# Patient Record
Sex: MALE | Race: BLACK OR AFRICAN AMERICAN | NOT HISPANIC OR LATINO | URBAN - METROPOLITAN AREA
[De-identification: names, ages, dates, MRNs, and addresses within clinical notes are randomized per-mention and may not be internally consistent; named-entity substitution may affect disease eponyms.]

---

## 2017-08-12 ENCOUNTER — HOSPITAL ENCOUNTER (EMERGENCY)
Facility: HOSPITAL | Age: 32
Discharge: HOME | End: 2017-08-12
Attending: EMERGENCY MEDICINE | Admitting: EMERGENCY MEDICINE

## 2017-08-12 VITALS
HEART RATE: 91 BPM | BODY MASS INDEX: 34.07 KG/M2 | SYSTOLIC BLOOD PRESSURE: 136 MMHG | OXYGEN SATURATION: 99 % | TEMPERATURE: 98.4 F | WEIGHT: 230 LBS | DIASTOLIC BLOOD PRESSURE: 87 MMHG | HEIGHT: 69 IN | RESPIRATION RATE: 18 BRPM

## 2017-08-12 DIAGNOSIS — R73.9 HYPERGLYCEMIA: ICD-10-CM

## 2017-08-12 DIAGNOSIS — E11.9 TYPE 2 DIABETES MELLITUS WITHOUT COMPLICATION, WITHOUT LONG-TERM CURRENT USE OF INSULIN (HCC): Primary | ICD-10-CM

## 2017-08-12 LAB
ALBUMIN SERPL-MCNC: 4.8 G/DL (ref 3.2–4.8)
ALBUMIN/GLOB SERPL: 1.6 G/DL (ref 1.5–2.5)
ALP SERPL-CCNC: 137 U/L (ref 25–100)
ALT SERPL W P-5'-P-CCNC: 24 U/L (ref 7–40)
ANION GAP SERPL CALCULATED.3IONS-SCNC: 11 MMOL/L (ref 3–11)
AST SERPL-CCNC: 14 U/L (ref 0–33)
B-OH-BUTYR SERPL-SCNC: 1.09 MMOL/L
BACTERIA UR QL AUTO: ABNORMAL /HPF
BASOPHILS # BLD AUTO: 0.03 10*3/MM3 (ref 0–0.2)
BASOPHILS NFR BLD AUTO: 0.4 % (ref 0–1)
BILIRUB SERPL-MCNC: 0.4 MG/DL (ref 0.3–1.2)
BILIRUB UR QL STRIP: NEGATIVE
BUN BLD-MCNC: 12 MG/DL (ref 9–23)
BUN/CREAT SERPL: 10 (ref 7–25)
CALCIUM SPEC-SCNC: 10.1 MG/DL (ref 8.7–10.4)
CHLORIDE SERPL-SCNC: 91 MMOL/L (ref 99–109)
CLARITY UR: ABNORMAL
CO2 SERPL-SCNC: 24 MMOL/L (ref 20–31)
COLOR UR: YELLOW
CREAT BLD-MCNC: 1.2 MG/DL (ref 0.6–1.3)
DEPRECATED RDW RBC AUTO: 37.7 FL (ref 37–54)
EOSINOPHIL # BLD AUTO: 0.12 10*3/MM3 (ref 0–0.3)
EOSINOPHIL NFR BLD AUTO: 1.5 % (ref 0–3)
ERYTHROCYTE [DISTWIDTH] IN BLOOD BY AUTOMATED COUNT: 12.7 % (ref 11.3–14.5)
GFR SERPL CREATININE-BSD FRML MDRD: 85 ML/MIN/1.73
GLOBULIN UR ELPH-MCNC: 3 GM/DL
GLUCOSE BLD-MCNC: 580 MG/DL (ref 70–100)
GLUCOSE BLDC GLUCOMTR-MCNC: 421 MG/DL (ref 70–130)
GLUCOSE BLDC GLUCOMTR-MCNC: 451 MG/DL (ref 70–130)
GLUCOSE BLDC GLUCOMTR-MCNC: 489 MG/DL (ref 70–130)
GLUCOSE BLDC GLUCOMTR-MCNC: 563 MG/DL (ref 70–130)
GLUCOSE UR STRIP-MCNC: ABNORMAL MG/DL
HBA1C MFR BLD: 9.4 % (ref 4.8–5.6)
HCT VFR BLD AUTO: 44 % (ref 38.9–50.9)
HGB BLD-MCNC: 15.7 G/DL (ref 13.1–17.5)
HGB UR QL STRIP.AUTO: NEGATIVE
HOLD SPECIMEN: NORMAL
HOLD SPECIMEN: NORMAL
HYALINE CASTS UR QL AUTO: ABNORMAL /LPF
IMM GRANULOCYTES # BLD: 0.01 10*3/MM3 (ref 0–0.03)
IMM GRANULOCYTES NFR BLD: 0.1 % (ref 0–0.6)
KETONES UR QL STRIP: ABNORMAL
LEUKOCYTE ESTERASE UR QL STRIP.AUTO: NEGATIVE
LYMPHOCYTES # BLD AUTO: 3.75 10*3/MM3 (ref 0.6–4.8)
LYMPHOCYTES NFR BLD AUTO: 47.2 % (ref 24–44)
MCH RBC QN AUTO: 28.6 PG (ref 27–31)
MCHC RBC AUTO-ENTMCNC: 35.7 G/DL (ref 32–36)
MCV RBC AUTO: 80.3 FL (ref 80–99)
MONOCYTES # BLD AUTO: 0.52 10*3/MM3 (ref 0–1)
MONOCYTES NFR BLD AUTO: 6.5 % (ref 0–12)
NEUTROPHILS # BLD AUTO: 3.51 10*3/MM3 (ref 1.5–8.3)
NEUTROPHILS NFR BLD AUTO: 44.3 % (ref 41–71)
NITRITE UR QL STRIP: NEGATIVE
PH UR STRIP.AUTO: 5.5 [PH] (ref 5–8)
PLATELET # BLD AUTO: 244 10*3/MM3 (ref 150–450)
PMV BLD AUTO: 10.2 FL (ref 6–12)
POTASSIUM BLD-SCNC: 4.3 MMOL/L (ref 3.5–5.5)
PROT SERPL-MCNC: 7.8 G/DL (ref 5.7–8.2)
PROT UR QL STRIP: NEGATIVE
RBC # BLD AUTO: 5.48 10*6/MM3 (ref 4.2–5.76)
RBC # UR: ABNORMAL /HPF
REF LAB TEST METHOD: ABNORMAL
SODIUM BLD-SCNC: 126 MMOL/L (ref 132–146)
SP GR UR STRIP: 1.04 (ref 1–1.03)
SQUAMOUS #/AREA URNS HPF: ABNORMAL /HPF
UROBILINOGEN UR QL STRIP: ABNORMAL
WBC NRBC COR # BLD: 7.94 10*3/MM3 (ref 3.5–10.8)
WBC UR QL AUTO: ABNORMAL /HPF
WHOLE BLOOD HOLD SPECIMEN: NORMAL
WHOLE BLOOD HOLD SPECIMEN: NORMAL

## 2017-08-12 PROCEDURE — 81001 URINALYSIS AUTO W/SCOPE: CPT

## 2017-08-12 PROCEDURE — 85025 COMPLETE CBC W/AUTO DIFF WBC: CPT

## 2017-08-12 PROCEDURE — 99284 EMERGENCY DEPT VISIT MOD MDM: CPT

## 2017-08-12 PROCEDURE — 80053 COMPREHEN METABOLIC PANEL: CPT

## 2017-08-12 PROCEDURE — 83036 HEMOGLOBIN GLYCOSYLATED A1C: CPT | Performed by: PHYSICIAN ASSISTANT

## 2017-08-12 PROCEDURE — 63710000001 INSULIN REGULAR HUMAN PER 5 UNITS: Performed by: PHYSICIAN ASSISTANT

## 2017-08-12 PROCEDURE — 82962 GLUCOSE BLOOD TEST: CPT

## 2017-08-12 PROCEDURE — 96361 HYDRATE IV INFUSION ADD-ON: CPT

## 2017-08-12 PROCEDURE — 86341 ISLET CELL ANTIBODY: CPT | Performed by: PHYSICIAN ASSISTANT

## 2017-08-12 PROCEDURE — 96360 HYDRATION IV INFUSION INIT: CPT

## 2017-08-12 PROCEDURE — 82010 KETONE BODYS QUAN: CPT

## 2017-08-12 RX ORDER — SODIUM CHLORIDE 0.9 % (FLUSH) 0.9 %
10 SYRINGE (ML) INJECTION AS NEEDED
Status: DISCONTINUED | OUTPATIENT
Start: 2017-08-12 | End: 2017-08-12 | Stop reason: HOSPADM

## 2017-08-12 RX ADMIN — SODIUM CHLORIDE 2000 ML: 9 INJECTION, SOLUTION INTRAVENOUS at 14:17

## 2017-08-12 RX ADMIN — INSULIN HUMAN 10 UNITS: 100 INJECTION, SOLUTION PARENTERAL at 15:31

## 2017-08-12 NOTE — ED PROVIDER NOTES
Subjective   Patient is a 31 y.o. male presenting with hyperglycemia.   History provided by:  Patient   used: No    Hyperglycemia   Blood sugar level PTA:  Too high to read  Severity:  Severe  Onset quality:  Gradual  Duration:  2 weeks  Timing:  Constant  Progression:  Worsening  Chronicity:  New  Diabetes status:  Non-diabetic  Relieved by:  Nothing  Ineffective treatments:  None tried  Associated symptoms: blurred vision and polyuria    Associated symptoms: no abdominal pain, no dysuria, no fever, no increased appetite, no nausea, no shortness of breath, no vomiting and no weakness    Risk factors: no obesity and no recent steroid use        Review of Systems   Constitutional: Negative for chills and fever.   HENT: Negative for ear pain, rhinorrhea and sore throat.    Eyes: Positive for blurred vision.   Respiratory: Negative for shortness of breath.    Gastrointestinal: Negative for abdominal pain, nausea and vomiting.   Endocrine: Positive for polyuria.   Genitourinary: Negative for dysuria, frequency and testicular pain.   Musculoskeletal: Negative for back pain and neck pain.   Neurological: Negative for seizures, weakness and headaches.   Psychiatric/Behavioral: Negative.    All other systems reviewed and are negative.      History reviewed. No pertinent past medical history.    No Known Allergies    History reviewed. No pertinent surgical history.    History reviewed. No pertinent family history.    Social History     Social History   • Marital status: Single     Spouse name: N/A   • Number of children: N/A   • Years of education: N/A     Social History Main Topics   • Smoking status: Current Every Day Smoker     Packs/day: 0.50   • Smokeless tobacco: None   • Alcohol use No   • Drug use: No   • Sexual activity: Defer     Other Topics Concern   • None     Social History Narrative   • None           Objective   Physical Exam   Constitutional: He is oriented to person, place, and time. He  appears well-developed and well-nourished.   HENT:   Head: Normocephalic and atraumatic.   Right Ear: External ear normal.   Left Ear: External ear normal.   Nose: Nose normal.   Mouth/Throat: Oropharynx is clear and moist.   Eyes: Conjunctivae and EOM are normal. Pupils are equal, round, and reactive to light. No scleral icterus.   Neck: Normal range of motion. No thyromegaly present.   Cardiovascular: Normal rate, regular rhythm and normal heart sounds.    Pulmonary/Chest: Effort normal and breath sounds normal. No respiratory distress. He has no wheezes. He has no rales. He exhibits no tenderness.   Abdominal: Soft. Bowel sounds are normal. He exhibits no distension. There is no tenderness.   Musculoskeletal: Normal range of motion.   Lymphadenopathy:     He has no cervical adenopathy.   Neurological: He is alert and oriented to person, place, and time. He has normal reflexes. He displays normal reflexes. No cranial nerve deficit. Coordination normal.   Skin: Skin is warm and dry.   Psychiatric: He has a normal mood and affect. His behavior is normal. Judgment and thought content normal.   Nursing note and vitals reviewed.      Procedures         ED Course  ED Course   Comment By Time   Discussed with Dr. Membreno.  Suggested starting on metformin 500 mg twice a day.  That she's happy to follow the patient up with their about 6 weeks out probably needs a PMD to follow-up with. ABDULAZIZ Landrum 08/12 1753        No results found for this or any previous visit (from the past 24 hour(s)).  Note: In addition to lab results from this visit, the labs listed above may include labs taken at another facility or during a different encounter within the last 24 hours. Please correlate lab times with ED admission and discharge times for further clarification of the services performed during this visit.    No orders to display     Vitals:    08/12/17 1649 08/12/17 1745 08/12/17 1746 08/12/17 1808   BP: 135/79 138/89 138/89  136/87   BP Location: Left arm  Left arm Right arm   Patient Position: Lying  Lying Sitting   Pulse: 98 97 95 91   Resp:    18   Temp:    98.4 °F (36.9 °C)   TempSrc:    Oral   SpO2: 97% 99% 99% 99%   Weight:       Height:         Medications   sodium chloride 0.9 % bolus 2,000 mL (0 mL Intravenous Stopped 8/12/17 1809)   insulin regular (humuLIN R,novoLIN R) injection 10 Units (10 Units Subcutaneous Given 8/12/17 1531)     ECG/EMG Results (last 24 hours)     ** No results found for the last 24 hours. **                  MDM  Number of Diagnoses or Management Options  new and requires workup  new and requires workup     Amount and/or Complexity of Data Reviewed  Clinical lab tests: reviewed and ordered  Tests in the radiology section of CPT®: reviewed and ordered  Tests in the medicine section of CPT®: ordered and reviewed  Discuss the patient with other providers: yes        Final diagnoses:   Type 2 diabetes mellitus without complication, without long-term current use of insulin   Hyperglycemia            ABDULAZIZ Landrum  08/16/17 0165

## 2017-08-12 NOTE — DISCHARGE INSTRUCTIONS
Follow up with one of the physician centers below to setup primary care.    [facility], [facility], [phone], [address], [address], [place], [address]    [facility], [phone], [address], [address]    [facility], [phone], [address] [place], [address];     [facility], [phone], [address], [address]      Follow up with one of the [facility] physician groups below to setup primary care. If you have trouble following up, please call Chiquis Lynch, our transitional care nurse, at [phone].    (Dr. Allen, Dr. Louis, Dr. Dickson, and Dr. Kunz.)  [facility], Primary Care, [phone], [address], [place] [address]    [facility], Primary Care, [phone], [address], [address] [place], [address] [facility], Primary Care, [phone], [address], [address] [place], [address] [facility] Medical [facility], Primary Care, [phone], [address], [address] [place], [address]     [place] [facility], Primary Care, [phone], [address], [address] [place], [address]    [place] [facility], Primary Care, [phone], [address], [place]    [place], [address] [facility], Primary Care, [phone], [address], [address] [place], [address] [facility], Primary Care, [phone], [address] [place], [address] [facility]) [facility] Medical [facility], Primary Care, [phone], [address], [address] [place], [address] [facility]  Medical [facility], Primary Care, [phone], [address], [address] [place], [id number] [place] [address]     ([facility]) [facility], Primary Care, [phone], [address], [place], [address] [place] [address], Primary Care, [phone], [address] [place], [place]    Keep a log of your blood sugars to take to your PMD when you see them.

## 2017-08-15 LAB — GAD65 AB SER-ACNC: <5 U/ML (ref 0–5)

## 2017-10-12 ENCOUNTER — OFFICE VISIT (OUTPATIENT)
Dept: FAMILY MEDICINE CLINIC | Facility: CLINIC | Age: 32
End: 2017-10-12

## 2017-10-12 ENCOUNTER — HOSPITAL ENCOUNTER (INPATIENT)
Facility: HOSPITAL | Age: 32
LOS: 1 days | Discharge: HOME | End: 2017-10-13
Attending: EMERGENCY MEDICINE | Admitting: INTERNAL MEDICINE

## 2017-10-12 VITALS
WEIGHT: 160 LBS | OXYGEN SATURATION: 98 % | HEART RATE: 98 BPM | RESPIRATION RATE: 18 BRPM | HEIGHT: 69 IN | BODY MASS INDEX: 23.7 KG/M2 | TEMPERATURE: 98.3 F

## 2017-10-12 DIAGNOSIS — IMO0002 UNCONTROLLED TYPE 2 DIABETES MELLITUS WITH COMPLICATION, WITHOUT LONG-TERM CURRENT USE OF INSULIN: Primary | ICD-10-CM

## 2017-10-12 DIAGNOSIS — N28.9 ACUTE RENAL INSUFFICIENCY: ICD-10-CM

## 2017-10-12 DIAGNOSIS — E86.0 DEHYDRATION: ICD-10-CM

## 2017-10-12 DIAGNOSIS — I49.9 IRREGULAR HEART RHYTHM: ICD-10-CM

## 2017-10-12 DIAGNOSIS — E08.10 DIABETIC KETOACIDOSIS WITHOUT COMA ASSOCIATED WITH DIABETES MELLITUS DUE TO UNDERLYING CONDITION (HCC): Primary | ICD-10-CM

## 2017-10-12 DIAGNOSIS — E11.65 TYPE 2 DIABETES MELLITUS WITH HYPERGLYCEMIA, WITHOUT LONG-TERM CURRENT USE OF INSULIN (HCC): ICD-10-CM

## 2017-10-12 PROBLEM — R63.4 UNINTENTIONAL WEIGHT LOSS: Status: ACTIVE | Noted: 2017-10-12

## 2017-10-12 PROBLEM — E11.10 DKA (DIABETIC KETOACIDOSES): Status: ACTIVE | Noted: 2017-10-12

## 2017-10-12 PROBLEM — E87.29 HIGH ANION GAP METABOLIC ACIDOSIS: Status: ACTIVE | Noted: 2017-10-12

## 2017-10-12 PROBLEM — N17.9 AKI (ACUTE KIDNEY INJURY): Status: ACTIVE | Noted: 2017-10-12

## 2017-10-12 PROBLEM — Z91.199 NONCOMPLIANCE: Status: ACTIVE | Noted: 2017-10-12

## 2017-10-12 PROBLEM — Z72.0 TOBACCO ABUSE: Status: ACTIVE | Noted: 2017-10-12

## 2017-10-12 LAB
ALBUMIN SERPL-MCNC: 4.7 G/DL (ref 3.2–4.8)
ALBUMIN SERPL-MCNC: 4.9 G/DL (ref 3.2–4.8)
ALBUMIN/GLOB SERPL: 1.5 G/DL (ref 1.5–2.5)
ALBUMIN/GLOB SERPL: 1.7 G/DL (ref 1.5–2.5)
ALP SERPL-CCNC: 101 U/L (ref 25–100)
ALP SERPL-CCNC: 104 U/L (ref 25–100)
ALT SERPL W P-5'-P-CCNC: 14 U/L (ref 7–40)
ALT SERPL W P-5'-P-CCNC: 14 U/L (ref 7–40)
ANION GAP SERPL CALCULATED.3IONS-SCNC: 15 MMOL/L (ref 3–11)
ANION GAP SERPL CALCULATED.3IONS-SCNC: 17 MMOL/L (ref 3–11)
ANION GAP SERPL CALCULATED.3IONS-SCNC: 9 MMOL/L (ref 3–11)
ARTERIAL PATENCY WRIST A: ABNORMAL
AST SERPL-CCNC: 11 U/L (ref 0–33)
AST SERPL-CCNC: 12 U/L (ref 0–33)
ATMOSPHERIC PRESS: ABNORMAL MMHG
B-OH-BUTYR SERPL-SCNC: 4.32 MMOL/L
BASE EXCESS BLDA CALC-SCNC: -10.2 MMOL/L (ref 0–2)
BASOPHILS # BLD AUTO: 0.03 10*3/MM3 (ref 0–0.2)
BASOPHILS NFR BLD AUTO: 0.5 % (ref 0–1)
BDY SITE: ABNORMAL
BILIRUB SERPL-MCNC: 0.4 MG/DL (ref 0.3–1.2)
BILIRUB SERPL-MCNC: 0.5 MG/DL (ref 0.3–1.2)
BILIRUB UR QL STRIP: NEGATIVE
BUN BLD-MCNC: 10 MG/DL (ref 9–23)
BUN BLD-MCNC: 12 MG/DL (ref 9–23)
BUN BLD-MCNC: 14 MG/DL (ref 9–23)
BUN/CREAT SERPL: 10 (ref 7–25)
BUN/CREAT SERPL: 10.8 (ref 7–25)
BUN/CREAT SERPL: 8 (ref 7–25)
CALCIUM SPEC-SCNC: 10.3 MG/DL (ref 8.7–10.4)
CALCIUM SPEC-SCNC: 9.1 MG/DL (ref 8.7–10.4)
CALCIUM SPEC-SCNC: 9.8 MG/DL (ref 8.7–10.4)
CHLORIDE SERPL-SCNC: 105 MMOL/L (ref 99–109)
CHLORIDE SERPL-SCNC: 89 MMOL/L (ref 99–109)
CHLORIDE SERPL-SCNC: 97 MMOL/L (ref 99–109)
CLARITY UR: CLEAR
CO2 BLDA-SCNC: 15.6 MMOL/L (ref 22–33)
CO2 SERPL-SCNC: 20 MMOL/L (ref 20–31)
COHGB MFR BLD: 2 % (ref 0–2)
COLOR UR: YELLOW
CREAT BLD-MCNC: 1 MG/DL (ref 0.6–1.3)
CREAT BLD-MCNC: 1.3 MG/DL (ref 0.6–1.3)
CREAT BLD-MCNC: 1.5 MG/DL (ref 0.6–1.3)
DEPRECATED RDW RBC AUTO: 43 FL (ref 37–54)
EOSINOPHIL # BLD AUTO: 0.06 10*3/MM3 (ref 0–0.3)
EOSINOPHIL NFR BLD AUTO: 1.1 % (ref 0–3)
ERYTHROCYTE [DISTWIDTH] IN BLOOD BY AUTOMATED COUNT: 14 % (ref 11.3–14.5)
GFR SERPL CREATININE-BSD FRML MDRD: 104 ML/MIN/1.73
GFR SERPL CREATININE-BSD FRML MDRD: 65 ML/MIN/1.73
GFR SERPL CREATININE-BSD FRML MDRD: 77 ML/MIN/1.73
GLOBULIN UR ELPH-MCNC: 2.9 GM/DL
GLOBULIN UR ELPH-MCNC: 3.2 GM/DL
GLUCOSE BLD-MCNC: 363 MG/DL (ref 70–100)
GLUCOSE BLD-MCNC: 375 MG/DL (ref 70–100)
GLUCOSE BLD-MCNC: 766 MG/DL (ref 70–100)
GLUCOSE BLDC GLUCOMTR-MCNC: 264 MG/DL (ref 70–130)
GLUCOSE BLDC GLUCOMTR-MCNC: 277 MG/DL (ref 70–130)
GLUCOSE BLDC GLUCOMTR-MCNC: 369 MG/DL (ref 70–130)
GLUCOSE BLDC GLUCOMTR-MCNC: 384 MG/DL (ref 70–130)
GLUCOSE BLDC GLUCOMTR-MCNC: 392 MG/DL (ref 70–130)
GLUCOSE UR STRIP-MCNC: ABNORMAL MG/DL
HBA1C MFR BLD: NORMAL %
HCO3 BLDA-SCNC: 14.7 MMOL/L (ref 20–26)
HCT VFR BLD AUTO: 46.7 % (ref 38.9–50.9)
HCT VFR BLD CALC: 43.4 %
HCT VFR BLDA CALC: 51.4 %
HGB BLD-MCNC: 15.7 G/DL (ref 13.1–17.5)
HGB BLDA-MCNC: 14.2 G/DL (ref 13.5–17.5)
HGB BLDA-MCNC: 16.6 G/DL
HGB UR QL STRIP.AUTO: NEGATIVE
HOLD SPECIMEN: NORMAL
HOLD SPECIMEN: NORMAL
HOROWITZ INDEX BLD+IHG-RTO: 21 %
IMM GRANULOCYTES # BLD: 0.02 10*3/MM3 (ref 0–0.03)
IMM GRANULOCYTES NFR BLD: 0.4 % (ref 0–0.6)
KETONES UR QL STRIP: ABNORMAL
LEUKOCYTE ESTERASE UR QL STRIP.AUTO: NEGATIVE
LYMPHOCYTES # BLD AUTO: 2.55 10*3/MM3 (ref 0.6–4.8)
LYMPHOCYTES NFR BLD AUTO: 46 % (ref 24–44)
MCH RBC QN AUTO: 28.1 PG (ref 27–31)
MCH, POC: 28.1
MCHC RBC AUTO-ENTMCNC: 33.6 G/DL (ref 32–36)
MCHC, POC: 32.3
MCV RBC AUTO: 83.7 FL (ref 80–99)
MCV, POC: 86.9
METHGB BLD QL: 1.2 % (ref 0–1.5)
MODALITY: ABNORMAL
MONOCYTES # BLD AUTO: 0.19 10*3/MM3 (ref 0–1)
MONOCYTES NFR BLD AUTO: 3.4 % (ref 0–12)
NEUTROPHILS # BLD AUTO: 2.69 10*3/MM3 (ref 1.5–8.3)
NEUTROPHILS NFR BLD AUTO: 48.6 % (ref 41–71)
NITRITE UR QL STRIP: NEGATIVE
OXYHGB MFR BLDV: 95.1 % (ref 94–99)
PCO2 BLDA: 29.4 MM HG (ref 35–48)
PH BLDA: 7.31 PH UNITS (ref 7.35–7.45)
PH UR STRIP.AUTO: <=5 [PH] (ref 5–8)
PLATELET # BLD AUTO: 194 10*3/MM3 (ref 150–450)
PLATELET # BLD: 203 10*3/MM3
PMV BLD AUTO: 11.8 FL (ref 6–12)
PMV BLD: 9 FL
PO2 BLDA: 106 MM HG (ref 83–108)
POTASSIUM BLD-SCNC: 4.3 MMOL/L (ref 3.5–5.5)
POTASSIUM BLD-SCNC: 4.5 MMOL/L (ref 3.5–5.5)
POTASSIUM BLD-SCNC: 5.4 MMOL/L (ref 3.5–5.5)
PROT SERPL-MCNC: 7.8 G/DL (ref 5.7–8.2)
PROT SERPL-MCNC: 7.9 G/DL (ref 5.7–8.2)
PROT UR QL STRIP: NEGATIVE
RBC # BLD AUTO: 5.58 10*6/MM3 (ref 4.2–5.76)
RBC, POC: 5.91
RDW, POC: 11.9
SODIUM BLD-SCNC: 124 MMOL/L (ref 132–146)
SODIUM BLD-SCNC: 134 MMOL/L (ref 132–146)
SODIUM BLD-SCNC: 134 MMOL/L (ref 132–146)
SP GR UR STRIP: 1.04 (ref 1–1.03)
UROBILINOGEN UR QL STRIP: ABNORMAL
WBC # BLD: 6.3 10*3/UL
WBC NRBC COR # BLD: 5.54 10*3/MM3 (ref 3.5–10.8)
WHOLE BLOOD HOLD SPECIMEN: NORMAL
WHOLE BLOOD HOLD SPECIMEN: NORMAL

## 2017-10-12 PROCEDURE — 82962 GLUCOSE BLOOD TEST: CPT

## 2017-10-12 PROCEDURE — 82962 GLUCOSE BLOOD TEST: CPT | Performed by: NURSE PRACTITIONER

## 2017-10-12 PROCEDURE — 82805 BLOOD GASES W/O2 SATURATION: CPT | Performed by: PHYSICIAN ASSISTANT

## 2017-10-12 PROCEDURE — 85025 COMPLETE CBC W/AUTO DIFF WBC: CPT | Performed by: EMERGENCY MEDICINE

## 2017-10-12 PROCEDURE — 93000 ELECTROCARDIOGRAM COMPLETE: CPT | Performed by: NURSE PRACTITIONER

## 2017-10-12 PROCEDURE — 36415 COLL VENOUS BLD VENIPUNCTURE: CPT

## 2017-10-12 PROCEDURE — 63710000001 INSULIN LISPRO (HUMAN) PER 5 UNITS: Performed by: NURSE PRACTITIONER

## 2017-10-12 PROCEDURE — 80053 COMPREHEN METABOLIC PANEL: CPT | Performed by: NURSE PRACTITIONER

## 2017-10-12 PROCEDURE — 99285 EMERGENCY DEPT VISIT HI MDM: CPT

## 2017-10-12 PROCEDURE — 82010 KETONE BODYS QUAN: CPT | Performed by: EMERGENCY MEDICINE

## 2017-10-12 PROCEDURE — 85025 COMPLETE CBC W/AUTO DIFF WBC: CPT | Performed by: NURSE PRACTITIONER

## 2017-10-12 PROCEDURE — 63710000001 INSULIN LISPRO (HUMAN) PER 5 UNITS: Performed by: PHYSICIAN ASSISTANT

## 2017-10-12 PROCEDURE — 99222 1ST HOSP IP/OBS MODERATE 55: CPT | Performed by: INTERNAL MEDICINE

## 2017-10-12 PROCEDURE — 84100 ASSAY OF PHOSPHORUS: CPT | Performed by: NURSE PRACTITIONER

## 2017-10-12 PROCEDURE — 63710000001 INSULIN DETEMIR PER 5 UNITS: Performed by: NURSE PRACTITIONER

## 2017-10-12 PROCEDURE — 83735 ASSAY OF MAGNESIUM: CPT | Performed by: NURSE PRACTITIONER

## 2017-10-12 PROCEDURE — 81003 URINALYSIS AUTO W/O SCOPE: CPT | Performed by: EMERGENCY MEDICINE

## 2017-10-12 PROCEDURE — 80048 BASIC METABOLIC PNL TOTAL CA: CPT | Performed by: NURSE PRACTITIONER

## 2017-10-12 PROCEDURE — 82330 ASSAY OF CALCIUM: CPT | Performed by: NURSE PRACTITIONER

## 2017-10-12 PROCEDURE — 99203 OFFICE O/P NEW LOW 30 MIN: CPT | Performed by: NURSE PRACTITIONER

## 2017-10-12 PROCEDURE — 80053 COMPREHEN METABOLIC PANEL: CPT | Performed by: EMERGENCY MEDICINE

## 2017-10-12 PROCEDURE — 36600 WITHDRAWAL OF ARTERIAL BLOOD: CPT | Performed by: PHYSICIAN ASSISTANT

## 2017-10-12 PROCEDURE — 83036 HEMOGLOBIN GLYCOSYLATED A1C: CPT | Performed by: NURSE PRACTITIONER

## 2017-10-12 RX ORDER — POTASSIUM CHLORIDE 1.5 G/1.77G
40 POWDER, FOR SOLUTION ORAL AS NEEDED
Status: DISCONTINUED | OUTPATIENT
Start: 2017-10-12 | End: 2017-10-13 | Stop reason: HOSPADM

## 2017-10-12 RX ORDER — POTASSIUM CHLORIDE 1.5 G/1.77G
20 POWDER, FOR SOLUTION ORAL AS NEEDED
Status: DISCONTINUED | OUTPATIENT
Start: 2017-10-12 | End: 2017-10-13 | Stop reason: HOSPADM

## 2017-10-12 RX ORDER — POTASSIUM CHLORIDE 750 MG/1
40 CAPSULE, EXTENDED RELEASE ORAL AS NEEDED
Status: DISCONTINUED | OUTPATIENT
Start: 2017-10-12 | End: 2017-10-13 | Stop reason: HOSPADM

## 2017-10-12 RX ORDER — NICOTINE 21 MG/24HR
1 PATCH, TRANSDERMAL 24 HOURS TRANSDERMAL
Status: DISCONTINUED | OUTPATIENT
Start: 2017-10-13 | End: 2017-10-13 | Stop reason: HOSPADM

## 2017-10-12 RX ORDER — HEPARIN SODIUM 5000 [USP'U]/ML
5000 INJECTION, SOLUTION INTRAVENOUS; SUBCUTANEOUS EVERY 8 HOURS SCHEDULED
Status: DISCONTINUED | OUTPATIENT
Start: 2017-10-13 | End: 2017-10-13 | Stop reason: HOSPADM

## 2017-10-12 RX ORDER — SODIUM CHLORIDE 0.9 % (FLUSH) 0.9 %
10 SYRINGE (ML) INJECTION AS NEEDED
Status: DISCONTINUED | OUTPATIENT
Start: 2017-10-12 | End: 2017-10-13 | Stop reason: HOSPADM

## 2017-10-12 RX ORDER — POTASSIUM CHLORIDE 750 MG/1
20 CAPSULE, EXTENDED RELEASE ORAL AS NEEDED
Status: DISCONTINUED | OUTPATIENT
Start: 2017-10-12 | End: 2017-10-13 | Stop reason: HOSPADM

## 2017-10-12 RX ORDER — ACETAMINOPHEN 325 MG/1
650 TABLET ORAL EVERY 4 HOURS PRN
Status: DISCONTINUED | OUTPATIENT
Start: 2017-10-12 | End: 2017-10-13 | Stop reason: HOSPADM

## 2017-10-12 RX ORDER — POTASSIUM CHLORIDE 1.5 G/1.77G
10 POWDER, FOR SOLUTION ORAL AS NEEDED
Status: DISCONTINUED | OUTPATIENT
Start: 2017-10-12 | End: 2017-10-13 | Stop reason: HOSPADM

## 2017-10-12 RX ORDER — POTASSIUM CHLORIDE 7.46 G/1000ML
10 INJECTION, SOLUTION INTRAVENOUS
Status: DISCONTINUED | OUTPATIENT
Start: 2017-10-12 | End: 2017-10-13 | Stop reason: HOSPADM

## 2017-10-12 RX ORDER — NICOTINE 21 MG/24HR
1 PATCH, TRANSDERMAL 24 HOURS TRANSDERMAL ONCE
Status: DISCONTINUED | OUTPATIENT
Start: 2017-10-12 | End: 2017-10-13 | Stop reason: HOSPADM

## 2017-10-12 RX ORDER — POTASSIUM CHLORIDE 750 MG/1
10 CAPSULE, EXTENDED RELEASE ORAL AS NEEDED
Status: DISCONTINUED | OUTPATIENT
Start: 2017-10-12 | End: 2017-10-13 | Stop reason: HOSPADM

## 2017-10-12 RX ORDER — SODIUM CHLORIDE 9 MG/ML
125 INJECTION, SOLUTION INTRAVENOUS CONTINUOUS
Status: DISCONTINUED | OUTPATIENT
Start: 2017-10-12 | End: 2017-10-13 | Stop reason: HOSPADM

## 2017-10-12 RX ADMIN — INSULIN DETEMIR 10 UNITS: 100 INJECTION, SOLUTION SUBCUTANEOUS at 22:36

## 2017-10-12 RX ADMIN — NICOTINE 1 PATCH: 14 PATCH TRANSDERMAL at 22:37

## 2017-10-12 RX ADMIN — INSULIN LISPRO 4 UNITS: 100 INJECTION, SOLUTION INTRAVENOUS; SUBCUTANEOUS at 22:53

## 2017-10-12 RX ADMIN — SODIUM CHLORIDE 1000 ML: 9 INJECTION, SOLUTION INTRAVENOUS at 14:59

## 2017-10-12 RX ADMIN — SODIUM CHLORIDE 125 ML/HR: 9 INJECTION, SOLUTION INTRAVENOUS at 22:42

## 2017-10-12 RX ADMIN — INSULIN LISPRO 10 UNITS: 100 INJECTION, SOLUTION INTRAVENOUS; SUBCUTANEOUS at 16:07

## 2017-10-12 RX ADMIN — SODIUM CHLORIDE 1000 ML: 9 INJECTION, SOLUTION INTRAVENOUS at 16:00

## 2017-10-12 NOTE — ED PROVIDER NOTES
Subjective   HPI Comments: This is a 31-year-old male that presents to the ER from physician's office, [facility] with hyperglycemia.  Accu-Chek in the doctor's office was 369.  Patient says he was just recently diagnosed with diabetes mellitus in August 2017.  He has family history of diabetes in both his mother and maternal grandmother.  Both of his family members are on insulin.  Patient reports symptoms of polydipsia and polyuria.  He has actually lost a significant amount of weight since his diagnosis.  He lost about 45 pounds and says that he just is not hungry.  He denies any abdominal pain or nausea with vomiting.  He reports overall fatigue with malaise.  He was initiated on metformin 500 mg twice daily in August.  His nurse practitioner, Linda, just increased his metformin to 850 mg twice daily at today's appointment.  I asked patient if he was compliant and he says that he has been out of his meds for the last few days but his girlfriend just got them refilled.  Patient denies any vision changes.  He does wear glasses and has done so for quite some time.  He denies any decreased sensation to his lower extremities.  He does report dry cracked skin to his feet, but says that he always wears his shoes and there is no redness or ulcer formation.      Patient is a 31 y.o. male presenting with hyperglycemia.   History provided by:  Patient  Hyperglycemia   Blood sugar level PTA:  369  Onset quality:  Gradual  Duration:  2 months  Timing:  Constant  Chronicity:  New (Recent diagnosis of diabetes mellitus in 8/2017.  Seeing SB Mckeon, at [facility].)  Diabetes status:  Controlled with oral medications  Current diabetic therapy:  Metformin BID.  Patient just increased to 850 mg BID at PCP today.  Time since last antidiabetic medication:  1 hour  Context: new diabetes diagnosis and noncompliance (been out of medicine for a few days.)    Relieved by:   Nothing  Ineffective treatments:  None tried  Associated symptoms: fatigue, increased thirst, polyuria and weight change (Lost 45 lbs in 2 months.)    Associated symptoms: no abdominal pain, no altered mental status, no blurred vision, no chest pain, no confusion, no dehydration, no diaphoresis, no dizziness, no dysuria, no fever, no increased appetite, no nausea, no shortness of breath, no syncope, no vomiting and no weakness    Associated symptoms comment:  Fatigue with malaise.      Review of Systems   Constitutional: Positive for appetite change (anorexia with weight loss since dx of DM) and fatigue. Negative for diaphoresis and fever.   HENT: Negative.    Eyes: Negative for blurred vision.   Respiratory: Negative for cough, chest tightness, shortness of breath and wheezing.    Cardiovascular: Negative for chest pain, palpitations and syncope.   Gastrointestinal: Negative for abdominal pain, constipation, diarrhea, nausea and vomiting.   Endocrine: Positive for polydipsia and polyuria. Negative for polyphagia.   Genitourinary: Positive for frequency. Negative for dysuria, flank pain and urgency.   Musculoskeletal: Negative.    Skin: Negative.    Neurological: Negative for dizziness and weakness.   Psychiatric/Behavioral: Negative.  Negative for confusion.       Past Medical History:   Diagnosis Date   • Diabetes mellitus        No Known Allergies    History reviewed. No pertinent surgical history.    History reviewed. No pertinent family history.    Social History     Social History   • Marital status: Single     Spouse name: N/A   • Number of children: N/A   • Years of education: N/A     Social History Main Topics   • Smoking status: Current Every Day Smoker     Packs/day: 0.50     Types: Cigarettes   • Smokeless tobacco: Never Used   • Alcohol use No   • Drug use: No   • Sexual activity: Defer     Other Topics Concern   • None     Social History Narrative   • None           Objective   Physical Exam    Constitutional: He is oriented to person, place, and time. He appears well-developed and well-nourished. No distress.   HENT:   Head: Normocephalic and atraumatic.   Mouth/Throat: Mucous membranes are dry.   Eyes: Conjunctivae and EOM are normal. Pupils are equal, round, and reactive to light. No scleral icterus.   Neck: Normal range of motion. Neck supple.   Cardiovascular: Normal rate, regular rhythm and normal heart sounds.    Pulmonary/Chest: Effort normal and breath sounds normal. No respiratory distress.   Abdominal: Soft. He exhibits no distension. There is no tenderness.   Musculoskeletal: Normal range of motion. He exhibits no edema.    Diabetic foot exam performed: skin is dry with cracking to bilateral heels.  No wounds or ulceration appreciated.  Thickened toenails c/w onychomycosis.    Neurological Sensory Findings - Unaltered hot/cold right ankle/foot discrimination and unaltered hot/cold left ankle/foot discrimination. Unaltered sharp/dull right ankle/foot discrimination and unaltered sharp/dull left ankle/foot discrimination.    Vascular Status -  His exam exhibits right foot vasculature normal. His exam exhibits no right foot edema. His exam exhibits left foot vasculature normal. His exam exhibits no left foot edema.   Skin Integrity  -  His right foot skin is intact.     Sherwin 's left foot skin is intact. .  Lymphadenopathy:     He has no cervical adenopathy.   Neurological: He is alert and oriented to person, place, and time.   Skin: Skin is warm and dry. He is not diaphoretic.   Psychiatric: He has a normal mood and affect. His behavior is normal.   Nursing note and vitals reviewed.      Procedures         ED Course  ED Course   Comment By Time   Serum glucose is 766 and creatinine is 1.5.  Recent creatinine is 8/12/17 was 1.2. Awaiting serum ketone results. Deepika Ochoa PA-C 10/12 1601   Serum ketones are positive at 4.32.  Patient is resting comfortably and has received 2 L of normal  saline, along with 10 units of Humalog insulin.  The latest Accu-Chek is 392 at 1806.  I have paged the hospitalist for admission. Deepika Ochoa PA-C 10/12 1831   Discussed the patient with Dr. Benz, hospitalist and she is agreeable to admission to telemetry.  Discussed plan of admission with patient and his mother, who is now present at the bedside.  They verbalized understanding and are agreeable with admission. Deepika Ochoa PA-C 10/12 1845                Recent Results (from the past 24 hour(s))   POC Glucose Fingerstick    Collection Time: 10/12/17 11:17 AM   Result Value Ref Range    Glucose 369 (A) 70 - 130 mg/dL   POC CBC With / Auto Diff    Collection Time: 10/12/17 11:18 AM   Result Value Ref Range    WBC 6.3     RBC 5.91     Hemoglobin 16.6 g/dL    Hematocrit 51.4 %    MCV 86.9     MCH 28.1     MCHC 32.3     RDW-CV 11.9     MPV 9.0     Platelets 203 10*3/mm3   POC Glycosylated Hemoglobin (Hb A1C)    Collection Time: 10/12/17 11:19 AM   Result Value Ref Range    Hemoglobin A1C  %   Comprehensive Metabolic Panel    Collection Time: 10/12/17 11:46 AM   Result Value Ref Range    Glucose 375 (H) 70 - 100 mg/dL    BUN 14 9 - 23 mg/dL    Creatinine 1.30 0.60 - 1.30 mg/dL    Sodium 134 132 - 146 mmol/L    Potassium 4.5 3.5 - 5.5 mmol/L    Chloride 97 (L) 99 - 109 mmol/L    CO2 20.0 20.0 - 31.0 mmol/L    Calcium 10.3 8.7 - 10.4 mg/dL    Total Protein 7.8 5.7 - 8.2 g/dL    Albumin 4.90 (H) 3.20 - 4.80 g/dL    ALT (SGPT) 14 7 - 40 U/L    AST (SGOT) 12 0 - 33 U/L    Alkaline Phosphatase 104 (H) 25 - 100 U/L    Total Bilirubin 0.4 0.3 - 1.2 mg/dL    eGFR  African Amer 77 >60 mL/min/1.73    Globulin 2.9 gm/dL    A/G Ratio 1.7 1.5 - 2.5 g/dL    BUN/Creatinine Ratio 10.8 7.0 - 25.0    Anion Gap 17.0 (H) 3.0 - 11.0 mmol/L   Comprehensive Metabolic Panel    Collection Time: 10/12/17  2:13 PM   Result Value Ref Range    Glucose 766 (C) 70 - 100 mg/dL    BUN 12 9 - 23 mg/dL    Creatinine 1.50 (H) 0.60 - 1.30 mg/dL     Sodium 124 (L) 132 - 146 mmol/L    Potassium 5.4 3.5 - 5.5 mmol/L    Chloride 89 (L) 99 - 109 mmol/L    CO2 20.0 20.0 - 31.0 mmol/L    Calcium 9.8 8.7 - 10.4 mg/dL    Total Protein 7.9 5.7 - 8.2 g/dL    Albumin 4.70 3.20 - 4.80 g/dL    ALT (SGPT) 14 7 - 40 U/L    AST (SGOT) 11 0 - 33 U/L    Alkaline Phosphatase 101 (H) 25 - 100 U/L    Total Bilirubin 0.5 0.3 - 1.2 mg/dL    eGFR  African Amer 65 >60 mL/min/1.73    Globulin 3.2 gm/dL    A/G Ratio 1.5 1.5 - 2.5 g/dL    BUN/Creatinine Ratio 8.0 7.0 - 25.0    Anion Gap 15.0 (H) 3.0 - 11.0 mmol/L   Urinalysis With / Culture If Indicated - Urine, Clean Catch    Collection Time: 10/12/17  2:13 PM   Result Value Ref Range    Color, UA Yellow Yellow, Straw    Appearance, UA Clear Clear    pH, UA <=5.0 5.0 - 8.0    Specific Gravity, UA 1.038 (H) 1.001 - 1.030    Glucose, UA >=1000 mg/dL (3+) (A) Negative    Ketones, UA 80 mg/dL (3+) (A) Negative    Bilirubin, UA Negative Negative    Blood, UA Negative Negative    Protein, UA Negative Negative    Leuk Esterase, UA Negative Negative    Nitrite, UA Negative Negative    Urobilinogen, UA 0.2 E.U./dL 0.2 - 1.0 E.U./dL   Green Top (Gel)    Collection Time: 10/12/17  2:13 PM   Result Value Ref Range    Extra Tube Hold for add-ons.    Lavender Top    Collection Time: 10/12/17  2:13 PM   Result Value Ref Range    Extra Tube hold for add-on    Gold Top - SST    Collection Time: 10/12/17  2:13 PM   Result Value Ref Range    Extra Tube Hold for add-ons.    CBC Auto Differential    Collection Time: 10/12/17  2:13 PM   Result Value Ref Range    WBC 5.54 3.50 - 10.80 10*3/mm3    RBC 5.58 4.20 - 5.76 10*6/mm3    Hemoglobin 15.7 13.1 - 17.5 g/dL    Hematocrit 46.7 38.9 - 50.9 %    MCV 83.7 80.0 - 99.0 fL    MCH 28.1 27.0 - 31.0 pg    MCHC 33.6 32.0 - 36.0 g/dL    RDW 14.0 11.3 - 14.5 %    RDW-SD 43.0 37.0 - 54.0 fl    MPV 11.8 6.0 - 12.0 fL    Platelets 194 150 - 450 10*3/mm3    Neutrophil % 48.6 41.0 - 71.0 %    Lymphocyte % 46.0 (H) 24.0 -  44.0 %    Monocyte % 3.4 0.0 - 12.0 %    Eosinophil % 1.1 0.0 - 3.0 %    Basophil % 0.5 0.0 - 1.0 %    Immature Grans % 0.4 0.0 - 0.6 %    Neutrophils, Absolute 2.69 1.50 - 8.30 10*3/mm3    Lymphocytes, Absolute 2.55 0.60 - 4.80 10*3/mm3    Monocytes, Absolute 0.19 0.00 - 1.00 10*3/mm3    Eosinophils, Absolute 0.06 0.00 - 0.30 10*3/mm3    Basophils, Absolute 0.03 0.00 - 0.20 10*3/mm3    Immature Grans, Absolute 0.02 0.00 - 0.03 10*3/mm3   Beta Hydroxybutyrate Quantitative    Collection Time: 10/12/17  2:13 PM   Result Value Ref Range    Beta-Hydroxybutyrate Quant 4.320 (H) <=0.500 mmol/L   Light Blue Top    Collection Time: 10/12/17  2:14 PM   Result Value Ref Range    Extra Tube hold for add-on    Blood Gas, Arterial    Collection Time: 10/12/17  5:13 PM   Result Value Ref Range    Site Arterial: right radial     Herrera's Test N/A     pH, Arterial 7.306 (L) 7.350 - 7.450 pH units    pCO2, Arterial 29.4 (L) 35.0 - 48.0 mm Hg    pO2, Arterial 106.0 83.0 - 108.0 mm Hg    HCO3, Arterial 14.7 (L) 20.0 - 26.0 mmol/L    Base Excess, Arterial -10.2 (L) 0.0 - 2.0 mmol/L    Hemoglobin, Blood Gas 14.2 13.5 - 17.5 g/dL    Hematocrit, Blood Gas 43.4 %    Oxyhemoglobin 95.1 94 - 99 %    Methemoglobin 1.20 0.00 - 1.50 %    Carboxyhemoglobin 2.0 0 - 2 %    CO2 Content 15.6 (L) 22 - 33    Barometric Pressure for Blood Gas  mmHg    Modality Room Air     FIO2 21 %   POC Glucose Fingerstick    Collection Time: 10/12/17  5:19 PM   Result Value Ref Range    Glucose 384 (H) 70 - 130 mg/dL   POC Glucose Fingerstick    Collection Time: 10/12/17  6:06 PM   Result Value Ref Range    Glucose 392 (H) 70 - 130 mg/dL     Note: In addition to lab results from this visit, the labs listed above may include labs taken at another facility or during a different encounter within the last 24 hours. Please correlate lab times with ED admission and discharge times for further clarification of the services performed during this visit.    No orders to  display     Vitals:    10/12/17 1645 10/12/17 1822 10/12/17 1830 10/12/17 1900   BP:  112/72 122/68 139/95   BP Location:  Right arm     Patient Position:  Lying     Pulse: 94 77 73 85   Resp:  12     Temp:  97.8 °F (36.6 °C)     TempSrc:  Oral     SpO2: 100% 100% 97% 100%   Weight:       Height:         Medications   sodium chloride 0.9 % flush 10 mL (not administered)   sodium chloride 0.9 % bolus 1,000 mL (0 mL Intravenous Stopped 10/12/17 1559)   insulin lispro (humaLOG) injection 10 Units (10 Units Intravenous Given 10/12/17 1607)   sodium chloride 0.9 % bolus 1,000 mL (0 mL Intravenous Stopped 10/12/17 1915)     ECG/EMG Results (last 24 hours)     ** No results found for the last 24 hours. **            MDM    Final diagnoses:   Diabetic ketoacidosis without coma associated with diabetes mellitus due to underlying condition   Acute renal insufficiency   Dehydration   Type 2 diabetes mellitus with hyperglycemia, without long-term current use of insulin            Deepika Ochoa PA-C  10/12/17 1919

## 2017-10-12 NOTE — PROGRESS NOTES
Subjective   Sherwin Lund is a 31 y.o. male.   Mr. Lund presents today to [facility]-patient is a newly diagnosed Type 2 diabetic s/p ER visit on 8/12/17. Mr. Lund does not have a PCP and is out of his metformin.     Diabetes   He presents for his initial diabetic visit. He has type 2 diabetes mellitus. No MedicAlert identification noted. Onset time: Diagnosed 8/12/17. His disease course has been worsening. There are no hypoglycemic associated symptoms. Pertinent negatives for hypoglycemia include no dizziness or headaches. Associated symptoms include fatigue, polyuria, weakness and weight loss. Pertinent negatives for diabetes include no blurred vision, no chest pain, no foot paresthesias, no foot ulcerations and no visual change. There are no hypoglycemic complications. Symptoms are worsening. There are no diabetic complications. Risk factors for coronary artery disease include male sex and family history. Current diabetic treatment includes oral agent (monotherapy).       The following portions of the patient's history were reviewed and updated as appropriate: allergies, current medications, past family history, past medical history, past social history, past surgical history and problem list.    Review of Systems   Constitutional: Positive for fatigue, unexpected weight change and weight loss. Negative for appetite change, chills, diaphoresis and fever.   Eyes: Negative for blurred vision.   Respiratory: Negative for chest tightness and shortness of breath.    Cardiovascular: Positive for palpitations (occasional). Negative for chest pain and leg swelling.   Gastrointestinal: Negative for abdominal pain, constipation, diarrhea and nausea.   Endocrine: Positive for polyuria.   Genitourinary: Negative for dysuria.   Musculoskeletal: Negative for arthralgias and myalgias.   Skin: Negative.    Neurological: Positive for weakness. Negative for dizziness and headaches.   Psychiatric/Behavioral:  Negative.        Objective   Physical Exam   Constitutional: He is oriented to person, place, and time. No distress.   HENT:   Head: Normocephalic and atraumatic.   Right Ear: External ear normal.   Left Ear: External ear normal.   Nose: Nose normal.   Mouth/Throat: Oropharynx is clear and moist.   Eyes: Pupils are equal, round, and reactive to light.   Neck: Normal range of motion. Neck supple. No thyromegaly present.   Cardiovascular: Normal rate, S1 normal, S2 normal and intact distal pulses.  An irregularly irregular rhythm present.   No murmur heard.  Pulmonary/Chest: Effort normal and breath sounds normal.   Abdominal: Soft. Bowel sounds are normal. He exhibits no distension and no mass. There is no tenderness.   Lymphadenopathy:     He has no cervical adenopathy.   Neurological: He is alert and oriented to person, place, and time.   Skin: Skin is warm and dry. He is not diaphoretic.   Psychiatric: He has a normal mood and affect. His behavior is normal. Judgment and thought content normal.   Vitals reviewed.      Assessment/Plan   Sherwin was seen today for establish care and diabetes.    Diagnoses and all orders for this visit:    Type 2 diabetes mellitus without complication, without long-term current use of insulin  -     metFORMIN (GLUCOPHAGE) 850 MG tablet; Take 1 tablet by mouth 2 (Two) Times a Day With Meals.  -     POC Glucose Fingerstick  -     POC Glycosylated Hemoglobin (Hb A1C)  -     POC CBC With / Auto Diff  -     Comprehensive Metabolic Panel    Irregular heart rhythm  -     POC Glucose Fingerstick  -     POC Glycosylated Hemoglobin (Hb A1C)  -     POC CBC With / Auto Diff  -     Comprehensive Metabolic Panel         ECG 12 Lead  Date/Time: 10/12/2017 11:09 AM  Performed by: SHLOMO WOOD  Authorized by: SHLOMO WOOD   Comparison: not compared with previous ECG   Previous ECG: no previous ECG available  Rhythm: sinus rhythm  Ectopy: PVCs and couplets  Rate: normal  T wave elevation noted  on lead: ST elevation in inferior and lateral leads.  Other findings comments: Poor R wave progression  Clinical impression: abnormal ECG        Discussed the nature of the medical condition(s) risks, complications, implications, management, safe and proper use of medications. Patient advised to go to ER for further evaluation and management of his hyperglycemia and further diagnostic testing regarding his abnormal EKG. Patient verbalized understanding and agreement with plan of care.

## 2017-10-12 NOTE — PATIENT INSTRUCTIONS
Type 2 Diabetes Mellitus, Self Care, Adult  Caring for yourself after you have been diagnosed with type 2 diabetes (type 2 diabetes mellitus) means keeping your blood sugar (glucose) under control with a balance of:  · Nutrition.  · Exercise.  · Lifestyle changes.  · Medicines or insulin, if necessary.  · Support from your team of health care providers and others.  The following information explains what you need to know to manage your diabetes at home.  WHAT DO I NEED TO DO TO MANAGE MY BLOOD GLUCOSE?  · Check your blood glucose every day, as often as told by your health care provider.  · Contact your health care provider if your blood glucose is above your target for 2 tests in a row.  · Have your A1c (hemoglobin A1c) level checked at least two times a year, or as often as told by your health care provider.  Your health care provider will set individualized treatment goals for you. Generally, the goal of treatment is to maintain the following blood glucose levels:   · Before meals (preprandial):  mg/dL (4.4-7.2 mmol/L).  · After meals (postprandial): below 180 mg/dL (10 mmol/L).  · A1c level: less than 7%.  WHAT DO I NEED TO KNOW ABOUT HYPERGLYCEMIA AND HYPOGLYCEMIA?  What is hyperglycemia?  Hyperglycemia, also called high blood glucose, occurs when blood glucose is too high. Make sure you know the early signs of hyperglycemia, such as:  · Increased thirst.  · Hunger.  · Feeling very tired.  · Needing to urinate more often than usual.  · Blurry vision.  What is hypoglycemia?   Hypoglycemia, also called low blood glucose, occurs with a blood glucose level at or below 70 mg/dL (3.9 mmol/L). The risk for hypoglycemia increases during or after exercise, during sleep, during illness, and when skipping meals or not eating for a long time (fasting).   It is important to know the symptoms of hypoglycemia and treat it right away. Always have a 15-gram rapid-acting carbohydrate snack with you to treat low blood  glucose. Family members and close friends should also know the symptoms and should understand how to treat hypoglycemia, in case you are not able to treat yourself.  What are the symptoms of hypoglycemia?  Hypoglycemia symptoms can include:  · Hunger.  · Anxiety.  · Sweating and feeling clammy.  · Confusion.  · Dizziness or feeling light-headed.  · Sleepiness.  · Nausea.  · Increased heart rate.  · Headache.  · Blurry vision.  · Seizure.  · Nightmares.  · Tingling or numbness around the mouth, lips, or tongue.  · A change in speech.  · Decreased ability to concentrate.  · A change in coordination.  · Restless sleep.  · Tremors or shakes.  · Fainting.  · Irritability.  How do I treat hypoglycemia?  If you are alert and able to swallow safely, follow the 15:15 rule:  · Take 15 grams of a rapid-acting carbohydrate. Rapid-acting options include:    1 tube of glucose gel.    3 glucose pills.    6-8 pieces of hard candy.    4 oz (120 mL) of fruit juice.    4 oz (120 mL) of regular (not diet) soda.  · Check your blood glucose 15 minutes after you take the carbohydrate.  · If the repeat blood glucose level is still at or below 70 mg/dL (3.9 mmol/L), take 15 grams of a carbohydrate again.  · If your blood glucose level does not increase above 70 mg/dL (3.9 mmol/L) after 3 tries, seek emergency medical care.  · After your blood glucose level returns to normal, eat a meal or a snack within 1 hour.  How do I treat severe hypoglycemia?  Severe hypoglycemia is when your blood glucose level is at or below 54 mg/dL (3 mmol/L). Severe hypoglycemia is an emergency. Do not wait to see if the symptoms will go away. Get medical help right away. Call your local emergency services (911 in the [place] Do not drive yourself to the hospital.  If you have severe hypoglycemia and you cannot eat or drink, you may need an injection of glucagon. A family member or close friend should learn how to check your blood glucose and how to give you a  glucagon injection. Ask your health care provider if you need to have an emergency glucagon injection kit available.  Severe hypoglycemia may need to be treated in a hospital. The treatment may include getting glucose through an IV tube. You may also need treatment for the cause of your hypoglycemia.  CAN HAVING DIABETES PUT ME AT RISK FOR OTHER CONDITIONS?  Having diabetes can put you at risk for other long-term (chronic) conditions, such as heart disease and kidney disease. Your health care provider may prescribe medicines to help prevent complications from diabetes. These medicines may include:  · Aspirin.  · Medicine to lower cholesterol.  · Medicine to control blood pressure.  WHAT ELSE CAN I DO TO MANAGE MY DIABETES?  Take your diabetes medicines as told  · If your health care provider prescribed insulin or diabetes medicines, take them every day.  · Do not run out of insulin or other diabetes medicines that you take. Plan ahead so you always have these available.  · If you use insulin, adjust your dosage based on how physically active you are and what foods you eat. Your health care provider will tell you how to adjust your dosage.  Make healthy food choices  The things that you eat and drink affect your blood glucose and your insulin dosage. Making good choices helps to control your diabetes and prevent other health problems. A healthy meal plan includes eating lean proteins, complex carbohydrates, fresh fruits and vegetables, low-fat dairy products, and healthy fats.   Make an appointment to see a diet and nutrition specialist (registered dietitian) to help you create an eating plan that is right for you. Make sure that you:  · Follow instructions from your health care provider about eating or drinking restrictions.  · Drink enough fluid to keep your urine clear or pale yellow.  · Eat healthy snacks between nutritious meals.  · Track the carbohydrates that you eat. Do this by reading food labels and  learning the standard serving sizes of foods.  · Follow your sick day plan whenever you cannot eat or drink as usual. Make this plan in advance with your health care provider.  Stay active  Exercise regularly, as told by your health care provider. This may include:   · Stretching and doing strength exercises, such as yoga or weightlifting, at least 2 times a week.  · Doing at least 150 minutes of moderate-intensity or vigorous-intensity exercise each week. This could be brisk walking, biking, or water aerobics.    Spread out your activity over at least 3 days of the week.    Do not go more than 2 days in a row without doing some kind of physical activity.  When you start a new exercise or activity, work with your health care provider to adjust your insulin, medicines, or food intake as needed.  Make healthy lifestyle choices  · Do not use any tobacco products, such as cigarettes, chewing tobacco, and e-cigarettes. If you need help quitting, ask your health care provider.  · If your health care provider says that alcohol is safe for you, limit alcohol intake to no more than 1 drink per day for nonpregnant women and 2 drinks per day for men. One drink equals 12 oz of beer, 5 oz of wine, or 1½ oz of hard liquor.  · Learn to manage stress. If you need help with this, ask your health care provider.  Care for your body  · Keep your immunizations up to date. In addition to getting vaccinations as told by your health care provider, it is recommended that you get vaccinated against the following illnesses:    The flu (influenza). Get a flu shot every year.    Pneumonia.    Hepatitis B.  · Schedule an eye exam soon after your diagnosis, and then one time every year after that.  · Check your skin and feet every day for cuts, bruises, redness, blisters, or sores. Schedule a foot exam with your health care provider once every year.  · Brush your teeth and gums two times a day, and floss at least one time a day. Visit your  dentist at least once every 6 months.  · Maintain a healthy weight.  General instructions  · Take over-the-counter and prescription medicines only as told by your health care provider.  · Share your diabetes management plan with people in your workplace, school, and household.  · Check your urine for ketones when you are ill and as told by your health care provider.  · Ask your health care provider:    Do I need to meet with a diabetes educator?    Where can I find a support group for people with diabetes?  · Carry a medical alert card or wear medical alert jewelry.  · Keep all follow-up visits as told by your health care provider. This is important.  WHERE TO FIND MORE INFORMATION:  For more information about diabetes, visit:  · American Diabetes Association (ADA): www.diabetes.org  · American Association of Diabetes Educators (AADE): www.diabeteseducator.org/patient-resources     This information is not intended to replace advice given to you by your health care provider. Make sure you discuss any questions you have with your health care provider.     Document Released: 04/10/2017 Document Reviewed: 01/20/2017  [employer] Interactive Patient Education ©2017 [employer]    Hyperglycemia  Hyperglycemia occurs when the glucose level in your blood is too high. Glucose is a type of sugar that is your body's main energy source. Hormones, such as insulin and glucagon, control the level of glucose in the blood. Insulin lowers blood glucose, and glucagon increases blood glucose. Sometimes, the body does not make enough insulin, or it cannot respond normally to the insulin that it makes. This can cause your blood glucose to rise. Hyperglycemia can happen to people who do or do not have diabetes. It can develop slowly or quickly and can be a medical emergency.  CAUSES  This condition may be caused by:  · Having pre-diabetes. People with pre-diabetes have hyperglycemia that is not high enough to be diagnosed as  diabetes.  · Having diabetes and not knowing it.  · Having diabetes and:    Not following your meal plan.    Not taking your diabetes medicines, or not taking them properly.    Being less physically active than usual.    Being too physically active.    Being sick or having an infection.    Taking other types of medicines that negatively affect your diabetes control or increase your blood glucose.  · Being stressed.  · Taking steroid medicines.  RISK FACTORS  You may be more likely to have this condition if you are at risk for diabetes. This includes people who:  · Have a family history of diabetes.  · Are of -American, , /, or American-[place] descent.  · Are older than age 45.  · Have had hyperglycemia during a pregnancy.  · Are overweight or obese.  · Have high blood pressure.  · Have high cholesterol.  · Do not get enough physical activity (have a sedentary lifestyle).  SYMPTOMS  You may have hyperglycemia without any symptoms. If you have symptoms, they may include:  · Increased urination.  · Increased thirst.  · Dry mouth.  · Increased appetite or a loss of appetite.  · Vision changes, such as blurred vision.  · Tiredness (fatigue).  · Fruity-smelling breath.  · Weakness.  · Unexpected weight gain or weight loss. Weight loss may be rapid.  · Tingling or numbness in your hands or feet.  · Headache.  · Skin that does not bounce back quickly when lightly pinched and released.  · Pain in your abdomen.  · Cuts or bruises that are slow to heal.  DIAGNOSIS  Your health care provider may suspect hyperglycemia from your medical history and physical exam. The diagnosis is made with a blood test that measures your glucose level. You may also have other blood tests to help find the cause of hyperglycemia. These include:  · An A1C (hemoglobin A1c) blood test. This test shows what your average blood glucose level was in a 3-month period.  · A blood glucose test taken after fasting for eight  hours.  · A glucose tolerance test. You take this test after drinking a sugar drink.  TREATMENT  Treatment will depend on what is causing your condition. Treatment may include:  · Adding, changing, or adjusting your diabetes medicines. It is very important to take any diabetes medicines as told by your health care provider.     · Changing or adjusting your meal plan.  · Treating an illness or infection.  · Having your blood glucose level tested more often.  · Changing your exercise plan.  · Decreasing or stopping steroids.  · Making lifestyle changes. These may include:    Exercising.    Losing weight.    Maintaining a healthy diet.  · Understanding what your blood glucose levels should be and what to do if they get too high.  HOME CARE INSTRUCTIONS  · If you have diabetes, follow your diabetes management plan. Make sure you:    Take your medicines as told.    Follow your exercise plan.    Follow your meal plan.    Test your blood glucose regularly. Check your blood glucose before and after exercise. If you exercise longer or in a different way than you usually do, be sure to check blood glucose more often.    Wear medical alert jewelry that says you have diabetes.  · Drink enough fluid to keep your urine clear or pale yellow.  · Maintain a healthy weight with diet and exercise. Ask your health care provider for a target weight goal.  · Do not use any tobacco products, such as cigarettes, chewing tobacco, and e-cigarettes. If you need help quitting, ask your health care provider.  · Limit alcohol intake to no more than 1 drink per day for nonpregnant women and 2 drinks per day for men. One drink equals 12 oz of beer, 5 oz of wine, or 1½ oz of hard liquor.  · Keep all follow-up visits as told by your health care provider. This is important.  SEEK MEDICAL CARE IF:  · Your blood glucose level is above your target range on two measures in a row.  · You are having frequent episodes of hyperglycemia.  SEEK IMMEDIATE  MEDICAL CARE IF:  · You have difficulty breathing.  · You have a change in how you think, feel, or act (mental status).  · You have nausea or vomiting that does not go away.  These symptoms may represent a problem that is an emergency. Do not wait to see if the symptoms will go away. Get medical help right away. Call your local emergency services (911 in the [place] Do not drive yourself to the hospital.     This information is not intended to replace advice given to you by your health care provider. Make sure you discuss any questions you have with your health care provider.     Document Released: 06/13/2002 Document Revised: 04/10/2017 Document Reviewed: 08/23/2016  [employer] Interactive Patient Education ©2017 [employer]    How to Avoid Diabetes Problems  You can do a lot to prevent or slow down diabetes problems. Following your diabetes plan and taking care of yourself can reduce your risk of serious or life-threatening complications. Below, you will find certain things you can do to prevent diabetes problems.  MANAGE YOUR DIABETES  Follow your health care provider's, nurse educator's, and dietitian's instructions for managing your diabetes. They will teach you the basics of diabetes care. They can help answer questions you may have. Learn about diabetes and make healthy choices regarding eating and physical activity. Monitor your blood glucose level regularly. Your health care provider will help you decide how often to check your blood glucose level depending on your treatment goals and how well you are meeting them.   DO NOT USE NICOTINE  Nicotine and diabetes are a dangerous combination. Nicotine raises your risk for diabetes problems. If you quit using nicotine, you will lower your risk for heart attack, stroke, nerve disease, and kidney disease. Your cholesterol and your blood pressure levels may improve. Your blood circulation will also improve. Do not use any tobacco products, including cigarettes,  chewing tobacco, or electronic cigarettes. If you need help quitting, ask your health care provider.  KEEP YOUR BLOOD PRESSURE UNDER CONTROL  Your health care provider will determine your individualized target blood pressure based on your age, your medicines, how long you have had diabetes, and any other medical conditions you have. Blood pressure consists of two numbers. Generally, the goal is to keep your top number (systolic pressure) at or below 130, and your bottom number (diastolic pressure) at or below 80. Your health care provider may recommend a lower target blood pressure reading, if appropriate. Meal planning, medicines, and exercise can help you reach your target blood pressure. Make sure your health care provider checks your blood pressure at every visit.  KEEP YOUR CHOLESTEROL UNDER CONTROL  Normal cholesterol levels will help prevent heart disease and stroke. These are the biggest health problems for people with diabetes. Keeping cholesterol levels under control can also help with blood flow. Have your cholesterol level checked at least once a year. Your health care provider may prescribe a medicine known as a statin. Statins lower your cholesterol. If you are not taking a statin, ask your health care provider if you should be. Meal planning, exercise, and medicines can help you reach your cholesterol targets.   SCHEDULE AND KEEP YOUR ANNUAL PHYSICAL EXAMS AND EYE EXAMS  Your health care provider will tell you how often he or she wants to see you depending on your plan of treatment. It is important that you keep these appointments so that possible problems can be identified early and complications can be avoided or treated.  · Every visit with your health care provider should include your weight, blood pressure, and an evaluation of your blood glucose control.  · Your hemoglobin A1c should be checked:    At least twice a year if you are at your goal.    Every 3 months if there are changes in  treatment.    If you are not meeting your goals.  · Your blood lipids should be checked yearly. You should also be checked yearly to see if you have protein in your urine (microalbumin).  · If you have type 1 diabetes, have an eye exam 3-5 years after you are diagnosed, and then once per year after your first exam.  · If you have type 2 diabetes, have an eye exam as soon as you are diagnosed, and then once per year after your first exam.  KEEP YOUR VACCINES CURRENT  It is recommended that you receive a flu (influenza) vaccine every year. It is also recommended that you receive a pneumonia (pneumococcal) vaccine and a hepatitis B vaccine. If you are 65 years of age or older and have never received a pneumonia vaccine, this vaccine may be given as a series of two separate shots. Ask your health care provider which additional vaccines may be recommended.  TAKE CARE OF YOUR FEET   Diabetes may cause you to have a poor blood supply (circulation) to your legs and feet. Because of this, the skin may be thinner, break easier, and heal more slowly. You also may have nerve damage in your legs and feet, causing decreased feeling. You may not notice minor injuries to your feet that could lead to serious problems or infections. Taking care of your feet is very important.  Visual foot exams are performed at every routine medical visit. The exams check for cuts, injuries, or other problems with the feet. A comprehensive foot exam should be done yearly. This includes visual inspection as well as assessing foot pulses and testing for loss of sensation. You should also do the following:  · Inspect your feet daily for cuts, calluses, blisters, ingrown toenails, and signs of infection, such as redness, swelling, or pus.  · Wash and dry your feet thoroughly, especially between the toes.  · Avoid soaking your feet regularly in hot water baths.  · Moisturize dry skin with lotion, avoiding areas between your toes.  · Cut toenails straight  across and file the edges.  · Avoid shoes that do not fit well or have areas that irritate your skin.  · Avoid going barefooted or wearing only socks. Your feet need protection.  TAKE CARE OF YOUR TEETH  People with poorly controlled diabetes are more likely to have gum (periodontal) disease. These infections make diabetes harder to control. Periodontal diseases, if left untreated, can lead to tooth loss. Brush your teeth twice a day, floss, and see your dentist for checkups and cleaning every 6 months, or 2 times a year.  ASK YOUR HEALTH CARE PROVIDER ABOUT TAKING ASPIRIN  Taking aspirin daily is recommended to help prevent cardiovascular disease in people with and without diabetes. Ask your health care provider if this would benefit you and what dose he or she would recommend.  DRINK RESPONSIBLY  Moderate amounts of alcohol (less than 1 drink per day for adult women and less than 2 drinks per day for adult men) have a minimal effect on blood glucose if ingested with food. It is important to eat food with alcohol to avoid hypoglycemia. People should avoid alcohol if they have a history of alcohol abuse or dependence, if they are pregnant, and if they have liver disease, pancreatitis, advanced neuropathy, or severe hypertriglyceridemia.  LESSEN STRESS  Living with diabetes can be stressful. When you are under stress, your blood glucose may be affected in two ways:  · Stress hormones may cause your blood glucose to rise.  · You may be distracted from taking good care of yourself.  It is a good idea to be aware of your stress level and make changes that are necessary to help you better manage challenging situations. Support groups, planned relaxation, a hobby you enjoy, meditation, healthy relationships, and exercise all work to lower your stress level. If your efforts do not seem to be helping, get help from your health care provider or a trained mental health professional.     This information is not intended to  replace advice given to you by your health care provider. Make sure you discuss any questions you have with your health care provider.     Document Released: 09/04/2012 Document Revised: 04/10/2017 Document Reviewed: 02/11/2015  [employer] Interactive Patient Education ©2017 [employer]    Palpitations  A palpitation is the feeling that your heartbeat is irregular or is faster than normal. It may feel like your heart is fluttering or skipping a beat. Palpitations are usually not a serious problem. They may be caused by many things, including smoking, caffeine, alcohol, stress, and certain medicines. Although most causes of palpitations are not serious, palpitations can be a sign of a serious medical problem. In some cases, you may need further medical evaluation.  HOME CARE INSTRUCTIONS  Pay attention to any changes in your symptoms. Take these actions to help with your condition:  · Avoid the following:    Caffeinated coffee, tea, soft drinks, diet pills, and energy drinks.    Chocolate.    Alcohol.  · Do not use any tobacco products, such as cigarettes, chewing tobacco, and e-cigarettes. If you need help quitting, ask your health care provider.  · Try to reduce your stress and anxiety. Things that can help you relax include:    Yoga.    Meditation.    Physical activity, such as swimming, jogging, or walking.    Biofeedback. This is a method that helps you learn to use your mind to control things in your body, such as your heartbeats.  · Get plenty of rest and sleep.  · Take over-the-counter and prescription medicines only as told by your health care provider.  · Keep all follow-up visits as told by your health care provider. This is important.  SEEK MEDICAL CARE IF:  · You continue to have a fast or irregular heartbeat after 24 hours.  · Your palpitations occur more often.  SEEK IMMEDIATE MEDICAL CARE IF:  · You have chest pain or shortness of breath.  · You have a severe headache.  · You feel dizzy or you  faint.     This information is not intended to replace advice given to you by your health care provider. Make sure you discuss any questions you have with your health care provider.     Document Released: 12/15/2001 Document Revised: 04/10/2017 Document Reviewed: 09/01/2016  [employer] Interactive Patient Education ©2017 [employer]    Type 2 Diabetes Mellitus, Diagnosis, Adult  Type 2 diabetes (type 2 diabetes mellitus) is a long-term (chronic) disease. In type 2 diabetes, one or both of these problems may be present:  · The pancreas does not make enough of a hormone called insulin.  · Cells in the body do not respond properly to insulin that the body makes (insulin resistance).  Normally, insulin allows blood sugar (glucose) to enter cells in the body. The cells use glucose for energy. Insulin resistance or lack of insulin causes excess glucose to build up in the blood instead of going into cells. As a result, high blood glucose (hyperglycemia) develops.  WHAT INCREASES THE RISK?  The following factors may make you more likely to develop type 2 diabetes:  · Having a family member with type 2 diabetes.  · Being overweight or obese.  · Having an inactive (sedentary) lifestyle.  · Having been diagnosed with insulin resistance.  · Having a history of prediabetes, gestational diabetes, or polycystic ovarian syndrome (PCOS).  · Being of [place], -American, /, or / descent.  WHAT ARE THE SIGNS OR SYMPTOMS?  In the early stage of this condition, you may not have symptoms. Symptoms develop slowly and may include:  · Increased thirst (polydipsia).  · Increased hunger (polyphagia).  · Increased urination (polyuria).  · Increased urination during the night (nocturia).  · Unexplained weight loss.  · Frequent infections that keep coming back (recurring).  · Fatigue.  · Weakness.  · Vision changes, such as blurry vision.  · Cuts or bruises that are slow to heal.  · Tingling or  numbness in the hands or feet.  · Dark patches on the skin (acanthosis nigricans).  HOW IS THIS DIAGNOSED?  This condition is diagnosed based on your symptoms, your medical history, a physical exam, and your blood glucose level. Your blood glucose may be checked with one or more of the following blood tests:  · A fasting blood glucose (FBG) test. You will not be allowed to eat (you will fast) for at least 8 hours before a blood sample is taken.  · A random blood glucose test. This checks blood glucose at any time of day regardless of when you ate.  · An A1c (hemoglobin A1c) blood test. This provides information about blood glucose control over the previous 2-3 months.  · An oral glucose tolerance test (OGTT ). This measures your blood glucose at two times:    After fasting. This is your baseline blood glucose level.    Two hours after drinking a beverage that contains glucose.  You may be diagnosed with type 2 diabetes if:  · Your FBG level is 126 mg/dL (7.0 mmol/L) or higher.  · Your random blood glucose level is 200 mg/dL (11.1 mmol/L) or higher.  · Your A1c level is 6.5% or higher.  · Your OGGT result is higher than 200 mg/dL (11.1 mmol/L).  These blood tests may be repeated to confirm your diagnosis.  HOW IS THIS TREATED?  Your treatment may be managed by a specialist called an endocrinologist. Type 2 diabetes may be treated by following instructions from your health care provider about:  · Making diet and lifestyle changes. This may include:    Following an individualized nutrition plan that is developed by a diet and nutrition specialist (registered dietitian).    Exercising regularly.    Finding ways to manage stress.  · Checking your blood glucose level as often as directed.  · Taking diabetes medicines or insulin daily. This helps to keep your blood glucose levels in the healthy range.    If you use insulin, you may need to adjust the dosage depending on how physically active you are and what foods you eat.  Your health care provider will tell you how to adjust your dosage.  · Taking medicines to help prevent complications from diabetes, such as:    Aspirin.    Medicine to lower cholesterol.    Medicine to control blood pressure.  Your health care provider will set individualized treatment goals for you. Your goals will be based on your age, other medical conditions you have, and how you respond to diabetes treatment. Generally, the goal of treatment is to maintain the following blood glucose levels:  · Before meals (preprandial):  mg/dL (4.4-7.2 mmol/L).  · After meals (postprandial): below 180 mg/dL (10 mmol/L).  · A1c level: less than 7%.  FOLLOW THESE INSTRUCTIONS AT HOME:  Questions to Ask Your Health Care Provider  Consider asking the following questions:  · Do I need to meet with a diabetes educator?  · Where can I find a support group for people with diabetes?  · What equipment will I need to manage my diabetes at home?  · What diabetes medicines do I need, and when should I take them?  · How often do I need to check my blood glucose?  · What number can I call if I have questions?  · When is my next appointment?  General Instructions  · Take over-the-counter and prescription medicines only as told by your health care provider.  · Keep all follow-up visits as told by your health care provider. This is important.  · For more information about diabetes, visit:    American Diabetes Association (ADA): www.diabetes.org    American Association of Diabetes Educators (AADE): www.diabeteseducator.org/patient-resources  CONTACT A HEALTH CARE PROVIDER IF:  · Your blood glucose is at or above 240 mg/dL (13.3 mmol/L) for 2 days in a row.  · You have been sick or have had a fever for 2 days or longer and you are not getting better.  · You have any of the following problems for more than 6 hours:    You cannot eat or drink.    You have nausea and vomiting.    You have diarrhea.  GET HELP RIGHT AWAY IF:  · Your blood  glucose is lower than 54 mg/dL (3.0 mmol/L).  · You become confused or you have trouble thinking clearly.  · You have difficulty breathing.  · You have moderate or large ketone levels in your urine.     This information is not intended to replace advice given to you by your health care provider. Make sure you discuss any questions you have with your health care provider.     Document Released: 12/18/2006 Document Revised: 04/10/2017 Document Reviewed: 01/20/2017  [employer] Interactive Patient Education ©2017 Elsevier Inc.  Blood Glucose Monitoring, Adult  Monitoring your blood glucose (also known as blood sugar) helps you to manage your diabetes. It also helps you and your health care provider monitor your diabetes and determine how well your treatment plan is working.  WHY SHOULD YOU MONITOR YOUR BLOOD GLUCOSE?  · It can help you understand how food, exercise, and medicine affect your blood glucose.  · It allows you to know what your blood glucose is at any given moment. You can quickly tell if you are having low blood glucose (hypoglycemia) or high blood glucose (hyperglycemia).  · It can help you and your health care provider know how to adjust your medicines.  · It can help you understand how to manage an illness or adjust medicine for exercise.  WHEN SHOULD YOU TEST?  Your health care provider will help you decide how often you should check your blood glucose. This may depend on the type of diabetes you have, your diabetes control, or the types of medicines you are taking. Be sure to write down all of your blood glucose readings so that this information can be reviewed with your health care provider. See below for examples of testing times that your health care provider may suggest.  Type 1 Diabetes  · Test your blood glucose at least 2 times a day.  · Also test your blood glucose:    Before every insulin injection.    Before and after exercise.    Between meals.    2 hours after a meal.    Between 2:00 a.m. and  3:00 a.m. on occasional days.  · You may need to test your blood glucose more often:    If you use an insulin pump.    If you need multiple daily injections.    If your diabetes is not well controlled.    If you are ill.  Type 2 Diabetes  · If you are taking insulin, test at least 2 times per day. However, it is best to test before every insulin injection.  · If you take medicines by mouth (orally), test 2 times a day.  · If you are on a controlled diet, test once a day.  · If your diabetes is not well controlled or if you are sick, you may need to monitor more often.  HOW TO MONITOR YOUR BLOOD GLUCOSE  Supplies Needed  · Blood glucose meter.  · Test strips for your meter. Each meter has its own strips. You must use the strips that go with your own meter.  · A pricking needle (lancet).  · A device that holds the lancet (lancing device).  · A journal or log book to write down your results.  Procedure  · Wash your hands with soap and water. Alcohol is not preferred.  · Prick the side of your finger (not the tip) with the lancet.  · Gently milk the finger until a small drop of blood appears.  · Follow the instructions that come with your meter for inserting the test strip, applying blood to the strip, and using your blood glucose meter.  Other Areas to Get Blood for Testing  Some meters allow you to use other areas of your body (other than your finger) to test your blood. These areas are called alternative sites. The most common alternative sites are:  · The forearm.  · The thigh.  · The back area of the lower leg.  · The palm of the hand.  The blood flow in these areas is slower. Therefore, the blood glucose values you get may be delayed, and the numbers are different from what you would get from your fingers. Do not use alternative sites if you think you are having hypoglycemia. Your reading will not be accurate. Always use a finger if you are having hypoglycemia. Also, if you cannot feel your lows (hypoglycemia  "unawareness), always use your fingers for your blood glucose checks.  ADDITIONAL TIPS FOR GLUCOSE MONITORING  · Do not reuse lancets.  · Always carry your supplies with you.  · All blood glucose meters have a 24-hour \"hotline\" number to call if you have questions or need help.  · Adjust (calibrate) your blood glucose meter with a control solution after finishing a few boxes of strips.  BLOOD GLUCOSE RECORD KEEPING  It is a good idea to keep a daily record or log of your blood glucose readings. Most glucose meters, if not all, keep your glucose records stored in the meter. Some meters come with the ability to download your records to your home computer. Keeping a record of your blood glucose readings is especially helpful if you are wanting to look for patterns. Make notes to go along with the blood glucose readings because you might forget what happened at that exact time. Keeping good records helps you and your health care provider to work together to achieve good diabetes management.      This information is not intended to replace advice given to you by your health care provider. Make sure you discuss any questions you have with your health care provider.     Document Released: 12/20/2004 Document Revised: 04/10/2017 Document Reviewed: 05/12/2014  Elsevier Interactive Patient Education ©2017 Elsevier Inc.    "

## 2017-10-13 VITALS
HEART RATE: 88 BPM | SYSTOLIC BLOOD PRESSURE: 107 MMHG | RESPIRATION RATE: 16 BRPM | OXYGEN SATURATION: 99 % | TEMPERATURE: 98.4 F | HEIGHT: 69 IN | BODY MASS INDEX: 23.7 KG/M2 | DIASTOLIC BLOOD PRESSURE: 67 MMHG | WEIGHT: 160 LBS

## 2017-10-13 PROBLEM — Z72.0 TOBACCO ABUSE: Chronic | Status: ACTIVE | Noted: 2017-10-12

## 2017-10-13 PROBLEM — E11.65 TYPE 2 DIABETES MELLITUS WITH HYPERGLYCEMIA: Chronic | Status: ACTIVE | Noted: 2017-10-12

## 2017-10-13 PROBLEM — E08.10 DIABETIC KETOACIDOSIS WITHOUT COMA ASSOCIATED WITH DIABETES MELLITUS DUE TO UNDERLYING CONDITION (HCC): Status: RESOLVED | Noted: 2017-10-12 | Resolved: 2017-10-13

## 2017-10-13 PROBLEM — E87.29 HIGH ANION GAP METABOLIC ACIDOSIS: Status: RESOLVED | Noted: 2017-10-12 | Resolved: 2017-10-13

## 2017-10-13 PROBLEM — N28.9 ACUTE RENAL INSUFFICIENCY: Status: RESOLVED | Noted: 2017-10-12 | Resolved: 2017-10-13

## 2017-10-13 LAB
ALBUMIN SERPL-MCNC: 3.6 G/DL (ref 3.2–4.8)
ALBUMIN/GLOB SERPL: 1.7 G/DL (ref 1.5–2.5)
ALP SERPL-CCNC: 75 U/L (ref 25–100)
ALT SERPL W P-5'-P-CCNC: 9 U/L (ref 7–40)
ANION GAP SERPL CALCULATED.3IONS-SCNC: 10 MMOL/L (ref 3–11)
ANION GAP SERPL CALCULATED.3IONS-SCNC: 14 MMOL/L (ref 3–11)
ANION GAP SERPL CALCULATED.3IONS-SCNC: 7 MMOL/L (ref 3–11)
ANION GAP SERPL CALCULATED.3IONS-SCNC: 8 MMOL/L (ref 3–11)
AST SERPL-CCNC: 10 U/L (ref 0–33)
BASOPHILS # BLD AUTO: 0.03 10*3/MM3 (ref 0–0.2)
BASOPHILS NFR BLD AUTO: 0.5 % (ref 0–1)
BILIRUB SERPL-MCNC: 0.3 MG/DL (ref 0.3–1.2)
BUN BLD-MCNC: 11 MG/DL (ref 9–23)
BUN BLD-MCNC: 8 MG/DL (ref 9–23)
BUN BLD-MCNC: 8 MG/DL (ref 9–23)
BUN BLD-MCNC: 9 MG/DL (ref 9–23)
BUN/CREAT SERPL: 10 (ref 7–25)
BUN/CREAT SERPL: 12.2 (ref 7–25)
BUN/CREAT SERPL: 8 (ref 7–25)
BUN/CREAT SERPL: 9 (ref 7–25)
CA-I SERPL ISE-MCNC: 1.29 MMOL/L (ref 1.12–1.32)
CA-I SERPL ISE-MCNC: 1.31 MMOL/L (ref 1.12–1.32)
CA-I SERPL ISE-MCNC: 1.34 MMOL/L (ref 1.12–1.32)
CA-I SERPL ISE-MCNC: 1.36 MMOL/L (ref 1.12–1.32)
CALCIUM SPEC-SCNC: 8.7 MG/DL (ref 8.7–10.4)
CALCIUM SPEC-SCNC: 8.8 MG/DL (ref 8.7–10.4)
CALCIUM SPEC-SCNC: 8.8 MG/DL (ref 8.7–10.4)
CALCIUM SPEC-SCNC: 9 MG/DL (ref 8.7–10.4)
CHLORIDE SERPL-SCNC: 104 MMOL/L (ref 99–109)
CHLORIDE SERPL-SCNC: 106 MMOL/L (ref 99–109)
CHLORIDE SERPL-SCNC: 107 MMOL/L (ref 99–109)
CHLORIDE SERPL-SCNC: 108 MMOL/L (ref 99–109)
CO2 SERPL-SCNC: 18 MMOL/L (ref 20–31)
CO2 SERPL-SCNC: 20 MMOL/L (ref 20–31)
CO2 SERPL-SCNC: 21 MMOL/L (ref 20–31)
CO2 SERPL-SCNC: 22 MMOL/L (ref 20–31)
CREAT BLD-MCNC: 0.8 MG/DL (ref 0.6–1.3)
CREAT BLD-MCNC: 0.9 MG/DL (ref 0.6–1.3)
CREAT BLD-MCNC: 1 MG/DL (ref 0.6–1.3)
CREAT BLD-MCNC: 1 MG/DL (ref 0.6–1.3)
DEPRECATED RDW RBC AUTO: 39.3 FL (ref 37–54)
EOSINOPHIL # BLD AUTO: 0.16 10*3/MM3 (ref 0–0.3)
EOSINOPHIL NFR BLD AUTO: 2.5 % (ref 0–3)
ERYTHROCYTE [DISTWIDTH] IN BLOOD BY AUTOMATED COUNT: 13.1 % (ref 11.3–14.5)
GFR SERPL CREATININE-BSD FRML MDRD: 104 ML/MIN/1.73
GFR SERPL CREATININE-BSD FRML MDRD: 104 ML/MIN/1.73
GFR SERPL CREATININE-BSD FRML MDRD: 118 ML/MIN/1.73
GFR SERPL CREATININE-BSD FRML MDRD: 135 ML/MIN/1.73
GLOBULIN UR ELPH-MCNC: 2.1 GM/DL
GLUCOSE BLD-MCNC: 198 MG/DL (ref 70–100)
GLUCOSE BLD-MCNC: 281 MG/DL (ref 70–100)
GLUCOSE BLD-MCNC: 334 MG/DL (ref 70–100)
GLUCOSE BLD-MCNC: 398 MG/DL (ref 70–100)
GLUCOSE BLDC GLUCOMTR-MCNC: 190 MG/DL (ref 70–130)
GLUCOSE BLDC GLUCOMTR-MCNC: 278 MG/DL (ref 70–130)
GLUCOSE BLDC GLUCOMTR-MCNC: 383 MG/DL (ref 70–130)
GLUCOSE BLDC GLUCOMTR-MCNC: 404 MG/DL (ref 70–130)
GLUCOSE BLDC GLUCOMTR-MCNC: 407 MG/DL (ref 70–130)
GLUCOSE BLDC GLUCOMTR-MCNC: 429 MG/DL (ref 70–130)
HCT VFR BLD AUTO: 37.3 % (ref 38.9–50.9)
HGB BLD-MCNC: 12.8 G/DL (ref 13.1–17.5)
IMM GRANULOCYTES # BLD: 0 10*3/MM3 (ref 0–0.03)
IMM GRANULOCYTES NFR BLD: 0 % (ref 0–0.6)
LYMPHOCYTES # BLD AUTO: 3.58 10*3/MM3 (ref 0.6–4.8)
LYMPHOCYTES NFR BLD AUTO: 56.2 % (ref 24–44)
MAGNESIUM SERPL-MCNC: 1.7 MG/DL (ref 1.3–2.7)
MAGNESIUM SERPL-MCNC: 2.5 MG/DL (ref 1.3–2.7)
MCH RBC QN AUTO: 27.8 PG (ref 27–31)
MCHC RBC AUTO-ENTMCNC: 34.3 G/DL (ref 32–36)
MCV RBC AUTO: 80.9 FL (ref 80–99)
MONOCYTES # BLD AUTO: 0.29 10*3/MM3 (ref 0–1)
MONOCYTES NFR BLD AUTO: 4.6 % (ref 0–12)
NEUTROPHILS # BLD AUTO: 2.31 10*3/MM3 (ref 1.5–8.3)
NEUTROPHILS NFR BLD AUTO: 36.2 % (ref 41–71)
PHOSPHATE SERPL-MCNC: 2.4 MG/DL (ref 2.4–5.1)
PHOSPHATE SERPL-MCNC: 2.5 MG/DL (ref 2.4–5.1)
PHOSPHATE SERPL-MCNC: 2.8 MG/DL (ref 2.4–5.1)
PHOSPHATE SERPL-MCNC: 2.9 MG/DL (ref 2.4–5.1)
PLATELET # BLD AUTO: 162 10*3/MM3 (ref 150–450)
PMV BLD AUTO: 11 FL (ref 6–12)
POTASSIUM BLD-SCNC: 3.4 MMOL/L (ref 3.5–5.5)
POTASSIUM BLD-SCNC: 3.5 MMOL/L (ref 3.5–5.5)
POTASSIUM BLD-SCNC: 3.5 MMOL/L (ref 3.5–5.5)
POTASSIUM BLD-SCNC: 4.3 MMOL/L (ref 3.5–5.5)
PROT SERPL-MCNC: 5.7 G/DL (ref 5.7–8.2)
RBC # BLD AUTO: 4.61 10*6/MM3 (ref 4.2–5.76)
SODIUM BLD-SCNC: 134 MMOL/L (ref 132–146)
SODIUM BLD-SCNC: 136 MMOL/L (ref 132–146)
SODIUM BLD-SCNC: 137 MMOL/L (ref 132–146)
SODIUM BLD-SCNC: 138 MMOL/L (ref 132–146)
TSH SERPL DL<=0.05 MIU/L-ACNC: 2.31 MIU/ML (ref 0.35–5.35)
WBC NRBC COR # BLD: 6.37 10*3/MM3 (ref 3.5–10.8)

## 2017-10-13 PROCEDURE — 82330 ASSAY OF CALCIUM: CPT | Performed by: NURSE PRACTITIONER

## 2017-10-13 PROCEDURE — 80053 COMPREHEN METABOLIC PANEL: CPT | Performed by: NURSE PRACTITIONER

## 2017-10-13 PROCEDURE — 80048 BASIC METABOLIC PNL TOTAL CA: CPT | Performed by: NURSE PRACTITIONER

## 2017-10-13 PROCEDURE — 83735 ASSAY OF MAGNESIUM: CPT | Performed by: NURSE PRACTITIONER

## 2017-10-13 PROCEDURE — 84681 ASSAY OF C-PEPTIDE: CPT | Performed by: NURSE PRACTITIONER

## 2017-10-13 PROCEDURE — 25010000002 HEPARIN (PORCINE) PER 1000 UNITS: Performed by: NURSE PRACTITIONER

## 2017-10-13 PROCEDURE — G0108 DIAB MANAGE TRN  PER INDIV: HCPCS | Performed by: REGISTERED NURSE

## 2017-10-13 PROCEDURE — 63710000001 INSULIN LISPRO (HUMAN) PER 5 UNITS: Performed by: NURSE PRACTITIONER

## 2017-10-13 PROCEDURE — 84100 ASSAY OF PHOSPHORUS: CPT | Performed by: NURSE PRACTITIONER

## 2017-10-13 PROCEDURE — 82962 GLUCOSE BLOOD TEST: CPT

## 2017-10-13 PROCEDURE — 99239 HOSP IP/OBS DSCHRG MGMT >30: CPT | Performed by: FAMILY MEDICINE

## 2017-10-13 PROCEDURE — 85025 COMPLETE CBC W/AUTO DIFF WBC: CPT | Performed by: NURSE PRACTITIONER

## 2017-10-13 PROCEDURE — 84443 ASSAY THYROID STIM HORMONE: CPT | Performed by: NURSE PRACTITIONER

## 2017-10-13 RX ORDER — MAGNESIUM SULFATE HEPTAHYDRATE 40 MG/ML
2 INJECTION, SOLUTION INTRAVENOUS AS NEEDED
Status: DISCONTINUED | OUTPATIENT
Start: 2017-10-13 | End: 2017-10-13 | Stop reason: HOSPADM

## 2017-10-13 RX ORDER — MAGNESIUM SULFATE HEPTAHYDRATE 40 MG/ML
4 INJECTION, SOLUTION INTRAVENOUS ONCE
Status: DISCONTINUED | OUTPATIENT
Start: 2017-10-13 | End: 2017-10-13 | Stop reason: HOSPADM

## 2017-10-13 RX ORDER — MAGNESIUM SULFATE HEPTAHYDRATE 40 MG/ML
4 INJECTION, SOLUTION INTRAVENOUS AS NEEDED
Status: DISCONTINUED | OUTPATIENT
Start: 2017-10-13 | End: 2017-10-13 | Stop reason: HOSPADM

## 2017-10-13 RX ADMIN — MAGNESIUM SULFATE HEPTAHYDRATE 4 G: 40 INJECTION, SOLUTION INTRAVENOUS at 10:40

## 2017-10-13 RX ADMIN — INSULIN LISPRO 6 UNITS: 100 INJECTION, SOLUTION INTRAVENOUS; SUBCUTANEOUS at 12:39

## 2017-10-13 RX ADMIN — POTASSIUM CHLORIDE 20 MEQ: 750 CAPSULE, EXTENDED RELEASE ORAL at 06:51

## 2017-10-13 RX ADMIN — SODIUM CHLORIDE 125 ML/HR: 9 INJECTION, SOLUTION INTRAVENOUS at 06:50

## 2017-10-13 RX ADMIN — HEPARIN SODIUM 5000 UNITS: 5000 INJECTION, SOLUTION INTRAVENOUS; SUBCUTANEOUS at 06:51

## 2017-10-13 RX ADMIN — METFORMIN HYDROCHLORIDE 850 MG: 850 TABLET, FILM COATED ORAL at 11:12

## 2017-10-13 RX ADMIN — INSULIN LISPRO 6 UNITS: 100 INJECTION, SOLUTION INTRAVENOUS; SUBCUTANEOUS at 00:44

## 2017-10-13 RX ADMIN — INSULIN LISPRO 2 UNITS: 100 INJECTION, SOLUTION INTRAVENOUS; SUBCUTANEOUS at 08:34

## 2017-10-13 RX ADMIN — INSULIN LISPRO 8 UNITS: 100 INJECTION, SOLUTION INTRAVENOUS; SUBCUTANEOUS at 02:12

## 2017-10-13 RX ADMIN — NICOTINE 1 PATCH: 14 PATCH TRANSDERMAL at 08:34

## 2017-10-13 NOTE — CONSULTS
Patient was seen for new diagnosis of type 2 diabetes. Discussed and taught patient about type 2 diabetes self-management, risk factors, and importance of blood glucose control to reduce complications. Target blood glucose readings and A1c goals per ADA were reviewed. Reviewed with patient current A1c and discussed its significance. Signs, symptoms and treatment of hyperglycemia and hypoglycemia were discussed. Lifestyle changes such as physical activity with MD approval. Patient was encouraged to keep record of blood glucose readings to take to follow up appointment with PCP.He shares with me that he was diagnosed in August at an [facility] location and provided a RX for metformin, then sent to local ER. He denies any type of education. His current A1C is greater then 15%. Discussed cost effective ways to be compliant with care, to include store brand meters and strips, and to utilize [facility] pharmacy for no cost metformin, as he stated that cost can sometimes be a barrier to care.   OP education was also encouraged for additional education once discharged.  Thank you for this referral. Malia Clifford RN Diabetes Education

## 2017-10-13 NOTE — PROGRESS NOTES
Discharge Planning Assessment  [facility]     Patient Name: Sherwin Lund  MRN: [id number]  Today's Date: 10/13/2017    Admit Date: 10/12/2017          Discharge Needs Assessment       10/13/17 1417    Living Environment    Lives With child(jannette), dependent    Living Arrangements apartment    Home Accessibility no concerns    Type of Financial/Environmental Concern not enough insurance    Transportation Available car    Living Environment Comment CM spoke with pt in room with permission in regards to discharge planning. Pt [place] in [place] in a duplex with his 10 y/o daughter. Pt statsws has mother and brother that help him. Pt is independent of ADLs.    Living Environment    Provides Primary Care For child(jannette)    Quality Of Family Relationships unable to assess    Able to Return to Prior Living Arrangements yes    Living Arrangement Comments Plan is home and he has family that can help him.    Discharge Needs Assessment    Concerns To Be Addressed financial/insurance concerns    Readmission Within The Last 30 Days no previous admission in last 30 days    Anticipated Changes Related to Illness none    Equipment Currently Used at Home glucometer    Equipment Needed After Discharge glucometer    Discharge Disposition still a patient    Discharge Contact Information if Applicable Marjan Lund(mother):  [phone]    Discharge Planning Comments Pt states doesnt currently have insurance. Pt requests assistance with finding a PCP. CM contacted [facility](covering for Chiquis Lynch-transition care coordinator) and pt has appt with PCP in [facility] on 10/30. Plan is home when medially ready for discharge and his mother or brother will help him. CM  will cont to follow            Discharge Plan       10/13/17 1422    Case Management/Social Work Plan    Plan discharge plan    Patient/Family In Agreement With Plan yes    Additional Comments Plan is home upon discharge. PCP appt arranged. Family will  provide tranpsortation.         Discharge Placement     No information found        Expected Discharge Date and Time     Expected Discharge Date Expected Discharge Time    Oct 13, 2017               Demographic Summary       10/13/17 1416    Primary Care Physician Information    Name Requests assistance with PCP            Functional Status       10/13/17 1416    Functional Status Prior    Ambulation 0-->independent    Transferring 0-->independent    Toileting 0-->independent    Bathing 0-->independent    Dressing 0-->independent    Eating 0-->independent    Communication 0-->understands/communicates without difficulty    Swallowing 0-->swallows foods/liquids without difficulty            Psychosocial     None            Abuse/Neglect     None            Legal     None            Substance Abuse     None            Patient Forms     None          Addie Morales RN

## 2017-10-13 NOTE — DISCHARGE SUMMARY
"    [facility] Medicine Services  DISCHARGE SUMMARY    Patient Name: Sherwin Lund  : 1985  MRN: [id number]    Date of Admission: 10/12/2017  Date of Discharge:    Length of Stay: 1  Primary Care Physician: No Known Provider    Consults     No orders found for last 30 day(s).          Hospital Course     Presenting Problem:   Diabetic ketoacidosis without coma associated with diabetes mellitus due to underlying condition [E08.10]      Active Hospital Problems (** Indicates Principal Problem)    Diagnosis Date Noted   • Type 2 diabetes mellitus with hyperglycemia [E11.65] 10/12/2017   • Noncompliance [Z91.19] 10/12/2017   • Unintentional weight loss [R63.4] 10/12/2017   • Tobacco abuse [Z72.0] 10/12/2017      Resolved Hospital Problems    Diagnosis Date Noted Date Resolved   • **Diabetic ketoacidosis without coma associated with diabetes mellitus due to underlying condition [E08.10] 10/12/2017 10/13/2017   • Acute renal insufficiency [N28.9] 10/12/2017 10/13/2017   • High anion gap metabolic acidosis [E87.2] 10/12/2017 10/13/2017          Hospital Course:  Sherwin Lund is a 31 y.o. male with past medical history significant for T2DM dx'd 2017 and tobacco abuse who presented from his PCP office to [facility] ED with complaints of fatigue. Patient currently complains of polydipsia, polyuria, and unplanned weight loss of approximately 45 lbs since August; however, patient has not taken his Metformin for the past 4-5 days stating that he \"ran out\". Patient initially presented to PCPs office secondary to fatigue and upon lab work noted that BG was 369. At that time, PCP increased patient's Metformin from 500 BID to 850 BID -- she advised to go to the ER if symptoms persisted. Pt states his Rx was not ready at [facility] so he took his Mother's Metformin 1000mg prior to arriving to the ED since he was concerned he was getting more fatigued and had worsened polyuria/dipsia. Upon arrival " to the ED his BG was noted to be 766.     Belen was admitted overnight for DKA with aggressive IVF's and insulin management.  His BG's are now reliably <200 and patient has been counseled on importance of compliance with DM meds, he seems responsive and that this episode/admission has heightened his awareness.  He willk be discharged with plans to resume his now increased Metformin dose (Rx has been sent electronically to his pharmacy) and f/u with PCP as planned 10/30/17.           Day of Discharge     HPI:   No complaints.  FSBS now <200, pt feels well.     Review of Systems    Otherwise complete 10-system ROS is negative except as mentioned in the HPI.      Vital Signs: Temp:  [97.8 °F (36.6 °C)-98.7 °F (37.1 °C)] 98.6 °F (37 °C)  Heart Rate:  [73-94] 79  Resp:  [12-18] 18  BP: (110-148)/(68-95) 110/68     Physical Exam:  Constitutional: No acute distress, awake, alert  HENT: NCAT, mucous membranes moist  Respiratory: respiratory effort normal   Cardiovascular: RRR  Gastrointestinal:  soft, nondistended  Musculoskeletal: No bilateral ankle edema  Psychiatric: Oriented x 3, appropriate affect, cooperative  Neurologic: Movements symmetric in all extremities, CN grossly intact to confrontation, speech is clear  Skin: No rashes        Pertinent  and/or Most Recent Results         Results from last 7 days  Lab Units 10/13/17  0725 10/13/17  0410 10/12/17  2327 10/12/17  1921 10/12/17  1413 10/12/17  1146 10/12/17  1118   WBC 10*3/mm3  --  6.37  --   --  5.54  --   --    HEMOGLOBIN g/dL  --  12.8*  --   --  15.7  --   --    HEMOGLOBIN, POC g/dL  --   --   --   --   --   --  16.6   HEMATOCRIT %  --  37.3*  --   --  46.7  --   --    HEMATOCRIT POC %  --   --   --   --   --   --  51.4   PLATELETS 10*3/mm3  --  162  --   --  194  --  203   SODIUM mmol/L 138 137 136 134 124* 134  --    POTASSIUM mmol/L 3.5 3.5 3.4* 4.3 5.4 4.5  --    CHLORIDE mmol/L 107 108 104 105 89* 97*  --    CO2 mmol/L 21.0 22.0 18.0* 20.0 20.0 20.0   --    BUN mg/dL 9 11 8* 10 12 14  --    CREATININE mg/dL 1.00 0.90 1.00 1.00 1.50* 1.30  --    GLUCOSE mg/dL 198* 281* 334* 363* 766* 375*  --    CALCIUM mg/dL 8.7 8.8 9.0 9.1 9.8 10.3  --        Results from last 7 days  Lab Units 10/13/17  0410 10/12/17  1413 10/12/17  1146   BILIRUBIN mg/dL 0.3 0.5 0.4   ALK PHOS U/L 75 101* 104*   ALT (SGPT) U/L 9 14 14   AST (SGOT) U/L 10 11 12          Invalid input(s): TG, LDLREALC    Results from last 7 days  Lab Units 10/13/17  0410   TSH mIU/mL 2.307     Brief Urine Lab Results  (Last result in the past 365 days)      Color   Clarity   Blood   Leuk Est   Nitrite   Protein   CREAT   Urine HCG        10/12/17 1413 Yellow Clear Negative Negative Negative Negative                    Imaging Results (all)     None                Order Current Status    C-Peptide In process          Discharge Details      Sherwin Lund   Home Medication Instructions JENS:[id number]    Printed on:10/13/17 1003   Medication Information                      metFORMIN (GLUCOPHAGE) 850 MG tablet  Take 1 tablet by mouth 2 (Two) Times a Day With Meals.                   Discharge Disposition:  Home or Self Care    Discharge Diet:  Consistent Carbohydrates    Discharge Activity:  As tolerates    Future Appointments  Date Time Provider Department Center   10/30/2017 10:15 AM [id number] None       Additional Instructions for the Follow-ups that You Need to Schedule     Discharge Follow-up with PCP    As directed    Follow Up Details:  Pt has an already scheduled f/u with his APRN PCP on 10/30/17                   Time Spent on Discharge: 40 min    June Francois MD  10/13/17  10:03 AM

## 2017-10-13 NOTE — H&P
"    [facility] Medicine Services  HISTORY AND PHYSICAL    Primary Care Physician: No Known Provider    Subjective     Chief Complaint: Fatigue    History of Present Illness: This is a pleasant, 31 year-old  male with past medical history significant for T2DM and tobacco abuse who presented from his PCP office to [facility] ED with complaints of fatigue. Patient initially presented to PCPs office secondary to fatigue and upon lab work noticed that BG was 369. At this time, PCP increased patient's Metformin from 500 BID to 850 BID - she advised to go to the ER if symptoms persisted. Patient currently complains of polydipsia, polyuria, and unplanned weight loss of approximately 45 lbs since August; however, patient has not taken his Metformin for the past 4-5 days stating that he \"ran out.\" Today he took his Mother's Metformin, 1000mg prior to arriving to the ED. Currently, he continues to complain of increased fatigue. Upon arrival to the ED his BG was noted to be 766. Patient denies any changes in vision, fever, chills, shortness of breath, nausea, vomiting, diarrhea, or chest pain. Patient will be admitted to the Hospitalist for further evaluation and treatment.     Initially upon evaluation of patient he did not want to be admitted, stating that he wanted to give his new dosing of Metformin \"time to work.\" I reordered a BMP - patient is requesting results of the BMP before making decision to be admitted. Discussed importance/risk of leaving AMA. Patient understands - family at bedside adamant about him being admitted.    BMP results showed anion gap of 9, compared to previously at 15. Current . Patient has agreed for inpatient admission.       Review of Systems   Constitutional: Positive for unexpected weight change. Negative for appetite change, chills, diaphoresis, fatigue and fever.   HENT: Negative for postnasal drip, rhinorrhea, sneezing and sore throat.    Eyes: Negative " "for visual disturbance.   Respiratory: Negative for cough, choking, chest tightness, shortness of breath, wheezing and stridor.    Cardiovascular: Negative for chest pain, palpitations and leg swelling.   Gastrointestinal: Negative for abdominal pain, constipation, diarrhea, nausea and vomiting.   Endocrine: Positive for polydipsia and polyuria. Negative for polyphagia.   Genitourinary: Negative for dysuria, frequency and hematuria.   Musculoskeletal: Negative for joint swelling, neck pain and neck stiffness.   Skin: Negative for pallor, rash and wound.   Neurological: Negative for tremors, syncope, speech difficulty, weakness and numbness.   Psychiatric/Behavioral: Negative for confusion and sleep disturbance. The patient is not nervous/anxious.           Past Medical History:   Diagnosis Date   • Diabetes mellitus        History reviewed. No pertinent surgical history.    Family History   Problem Relation Age of Onset   • Diabetes Mother    • Hypertension Father    • Diabetes Other        Social History     Social History   • Marital status: Single     Spouse name: N/A   • Number of children: N/A   • Years of education: N/A     Occupational History   • Not on file.     Social History Main Topics   • Smoking status: Current Every Day Smoker     Packs/day: 0.50     Years: 6.00     Types: Cigarettes   • Smokeless tobacco: Never Used   • Alcohol use No   • Drug use: No   • Sexual activity: Defer     Other Topics Concern   • Not on file     Social History Narrative    Independent, current occupation  at a gas station.        Medications:    (Not in a hospital admission)    Allergies:  No Known Allergies      Objective     Physical Exam:  Vital Signs: /87  Pulse 78  Temp 97.8 °F (36.6 °C) (Oral)  Comment: pt removed bp cuff  Resp 12  Ht 69\" (175.3 cm)  Wt 160 lb (72.6 kg)  SpO2 100%  BMI 23.63 kg/m2  Physical Exam   GEN:Patient is WD/WN, alert and oriented x3, in NAD  HEENT: AT/NC  NECK: " Neck is without mass or JVD  CV: RRR no m/r/g, S1,S2  LUNGS: CTAB no w/r/r  ABD: BSx4, NT/ND, no rebound or guarding, soft, without HSM or mass  SKIN: no rashes, lesions  NEURO: no focal deficits  PSYCH: Mood is appropriate  EXT: no c/c/e/e    Results Reviewed:    Results from last 7 days  Lab Units 10/12/17  1413   WBC 10*3/mm3 5.54   HEMOGLOBIN g/dL 15.7   PLATELETS 10*3/mm3 194       Results from last 7 days  Lab Units 10/12/17  1921   SODIUM mmol/L 134   POTASSIUM mmol/L 4.3   CO2 mmol/L 20.0   CREATININE mg/dL 1.00   GLUCOSE mg/dL 363*   CALCIUM mg/dL 9.1       I have personally reviewed and interpreted available lab data, radiology studies and ECG obtained at time of admission.     Assessment / Plan     Problem List:   Hospital Problem List     * (Principal)DKA (diabetic ketoacidoses)    Type 2 diabetes mellitus with hyperglycemia    Noncompliance    Unintentional weight loss    Acute renal insufficiency    High anion gap metabolic acidosis    Tobacco abuse          Assessment: 31-year-old  male with newly diagnosed T2DM (August 2017). Presents to ED from PCP office with complaints of increased fatigue.     Plan:  1. DKA  -BG upon arrival was 766.   -Metabolic Acidosis - pH 7.306, CO2 29.4, Bicarb 14.7.  -UA showed 3+ ketones and 3+ glucose.  -Na 124 - corrected Na 140.  -10 units humalog given in ED along with 2L NS, BG down to 384.  -Current BMP shows closing of anion gap at 9 - initially 15.   -Current .  -Hold insulin gtt for now.  -Check Q1 hour FSBG.  -Q4 hour BMPs.   -Currently electrolytes stable. Replace per protocol.  -Start long-acting insulin along with low-dose sliding scale for now. Will adjust accordingly if needed.   -Continue IVF.    2. T2DM vs. T1DM  -Diagnosed back in August.   -Currently has been on 500 Metformin BID - just increased dose to 850 BID today (10/12/2017).   -Familial history - Mother (T2), Maternal great-grandmother (T1).  -Noncompliant with diet.  Discussed importance of adherence to diet.   -Consult Diabetes educator.  -Consult nutrition.    3. Renal insufficiency  -Creatinine increased from 1.2 back in August to 1.5 upon admission.  -Likely has underlying CKD from #2, and secondary to #1.  -Hold nephrotoxic agents.   -2L NS given in ED.  -Continue IVF.   -Repeat BMP's Q4 hours.     4. Unintentional weight loss  -Patient reports approximate 45 lb weight loss since August.   -Reports decreased appetite with poor PO intake.   -? Underlying malignancy vs T1DM  -Patient was supposed to follow up with endocrinologist after PCP visit in August but never did.   -Check TSH.    5. Noncompliance  -Patient reports going 4-5 days without Metformin.  -Education provided about the importance of adhering to medication regimen and risk associated with noncompliance.   -Patient understands, patients family at bedside who reiterates the importance of compliance.     6. Tobacco abuse  -Reportedly smokes 0.5 PPD for 5-6 years.  -Nicotine patch.  -Smoking cessation education provided.       DVT prophylaxis: Teds/SCDs. Heparin.   Code Status: Full code.   Admission Status: INPATIENT status due to the need for care which can only be reasonably provided in an hospital setting such as aggressive/expedited ancillary services and/or consultation services, the necessity for IV medications, close physician monitoring and/or the possible need for procedures.  In such, I feel patient’s risk for adverse outcomes and need for care warrant INPATIENT evaluation and predict the patient’s care encounter to likely last beyond 2 midnights.       CONSTANCE Razo 10/12/17 8:20 PM       Brief Attending Admission Attestation     I have seen and examined the patient, performing an independent face-to-face diagnostic evaluation with plan of care reviewed and developed with the advanced practice clinician (APC).      Brief Summary Statement/HPI:   Mr. Lund is a 32 y/o AAM with PMH of newly  diagnosed diabetes and tobacco abuse who presents to [facility] after being seen at his PCP's office for elevated blood sugars. Patient was diagnosed with DM in August and was recently started on Metformin, however he admits to non-compliance over the last several days. He endorses polyuria, polydipsia and weight loss. In the ED he was found to have blood sugar >700.       Attending Physical Exam:  GEN:Patient is WD/WN AAM, alert and oriented x3, in NAD  HEENT: AT/NC  NECK: Neck is without mass or JVD  CV: RRR no m/r/g, S1,S2  LUNGS: CTAB no w/r/r  ABD: BSx4, NT/ND, no rebound or guarding, soft, without HSM or mass  SKIN: no rashes, lesions  NEURO: no focal deficits  PSYCH: Mood is appropriate  EXT: no c/c/e/e    Brief Assessment/Plan :  See above for further detailed assessment and plan developed with APC which I have reviewed and/or edited.    1. DKA - admitted with acidosis, elevated anion gap with ketosis. Corrected in the ED with administration of fluids and IV insulin. Currently will hold off on insulin gtt. Start long acting insulin with SSI. Trend BMP. Titrate long acting as able. Will need diabetic education and insulin teaching prior to D/C. Currently A1c >15.    INPATIENT status due to the need for care which can only be reasonably provided in an hospital setting such as aggressive/expedited ancillary services and/or consultation services, the necessity for IV medications, close physician monitoring and/or the possible need for procedures.  In such, I feel patient’s risk for adverse outcomes and need for care warrant INPATIENT evaluation and predict the patient’s care encounter to likely last beyond 2 midnights.      Gracia Benz DO  10/12/17  10:09 PM

## 2017-10-14 LAB — C PEPTIDE SERPL-MCNC: 0.9 NG/ML (ref 1.1–4.4)

## 2017-10-30 ENCOUNTER — OFFICE VISIT (OUTPATIENT)
Dept: FAMILY MEDICINE CLINIC | Facility: CLINIC | Age: 32
End: 2017-10-30

## 2017-10-30 VITALS
OXYGEN SATURATION: 98 % | WEIGHT: 167 LBS | SYSTOLIC BLOOD PRESSURE: 120 MMHG | HEIGHT: 69 IN | BODY MASS INDEX: 24.73 KG/M2 | DIASTOLIC BLOOD PRESSURE: 70 MMHG | HEART RATE: 100 BPM

## 2017-10-30 DIAGNOSIS — Z92.89 HOSPITALIZATION WITHIN LAST 30 DAYS: ICD-10-CM

## 2017-10-30 DIAGNOSIS — E11.10 DIABETIC KETOACIDOSIS WITHOUT COMA ASSOCIATED WITH TYPE 2 DIABETES MELLITUS (HCC): ICD-10-CM

## 2017-10-30 DIAGNOSIS — IMO0002 UNCONTROLLED TYPE 2 DIABETES MELLITUS WITH COMPLICATION, WITHOUT LONG-TERM CURRENT USE OF INSULIN: Primary | ICD-10-CM

## 2017-10-30 LAB
ALBUMIN SERPL-MCNC: 4.6 G/DL (ref 3.2–4.8)
ALBUMIN/GLOB SERPL: 2 G/DL (ref 1.5–2.5)
ALP SERPL-CCNC: 82 U/L (ref 25–100)
ALT SERPL W P-5'-P-CCNC: 13 U/L (ref 7–40)
ANION GAP SERPL CALCULATED.3IONS-SCNC: 8 MMOL/L (ref 3–11)
ARTICHOKE IGE QN: 103 MG/DL (ref 0–130)
AST SERPL-CCNC: 10 U/L (ref 0–33)
BASOPHILS # BLD AUTO: 0.04 10*3/MM3 (ref 0–0.2)
BASOPHILS NFR BLD AUTO: 0.6 % (ref 0–1)
BILIRUB BLD-MCNC: NEGATIVE MG/DL
BILIRUB SERPL-MCNC: 0.4 MG/DL (ref 0.3–1.2)
BUN BLD-MCNC: 9 MG/DL (ref 9–23)
BUN/CREAT SERPL: 8.2 (ref 7–25)
CALCIUM SPEC-SCNC: 9.7 MG/DL (ref 8.7–10.4)
CHLORIDE SERPL-SCNC: 98 MMOL/L (ref 99–109)
CHOLEST SERPL-MCNC: 265 MG/DL (ref 0–200)
CLARITY, POC: ABNORMAL
CO2 SERPL-SCNC: 26 MMOL/L (ref 20–31)
COLOR UR: YELLOW
CREAT BLD-MCNC: 1.1 MG/DL (ref 0.6–1.3)
DEPRECATED RDW RBC AUTO: 45.4 FL (ref 37–54)
EOSINOPHIL # BLD AUTO: 0.14 10*3/MM3 (ref 0–0.3)
EOSINOPHIL NFR BLD AUTO: 2.2 % (ref 0–3)
ERYTHROCYTE [DISTWIDTH] IN BLOOD BY AUTOMATED COUNT: 14.8 % (ref 11.3–14.5)
GFR SERPL CREATININE-BSD FRML MDRD: 93 ML/MIN/1.73
GLOBULIN UR ELPH-MCNC: 2.3 GM/DL
GLUCOSE BLD-MCNC: 456 MG/DL (ref 70–100)
GLUCOSE BLDC GLUCOMTR-MCNC: 441 MG/DL (ref 70–130)
GLUCOSE UR STRIP-MCNC: ABNORMAL MG/DL
HBA1C MFR BLD: 14.2 % (ref 4.8–5.6)
HCT VFR BLD AUTO: 43 % (ref 38.9–50.9)
HDLC SERPL-MCNC: 36 MG/DL (ref 40–60)
HGB BLD-MCNC: 13.9 G/DL (ref 13.1–17.5)
IMM GRANULOCYTES # BLD: 0.02 10*3/MM3 (ref 0–0.03)
IMM GRANULOCYTES NFR BLD: 0.3 % (ref 0–0.6)
KETONES UR QL: ABNORMAL
LEUKOCYTE EST, POC: NEGATIVE
LYMPHOCYTES # BLD AUTO: 2.61 10*3/MM3 (ref 0.6–4.8)
LYMPHOCYTES NFR BLD AUTO: 41.8 % (ref 24–44)
MCH RBC QN AUTO: 27.4 PG (ref 27–31)
MCHC RBC AUTO-ENTMCNC: 32.3 G/DL (ref 32–36)
MCV RBC AUTO: 84.8 FL (ref 80–99)
MONOCYTES # BLD AUTO: 0.37 10*3/MM3 (ref 0–1)
MONOCYTES NFR BLD AUTO: 5.9 % (ref 0–12)
NEUTROPHILS # BLD AUTO: 3.06 10*3/MM3 (ref 1.5–8.3)
NEUTROPHILS NFR BLD AUTO: 49.2 % (ref 41–71)
NITRITE UR-MCNC: NEGATIVE MG/ML
PH UR: 5 [PH] (ref 5–8)
PLATELET # BLD AUTO: 214 10*3/MM3 (ref 150–450)
PMV BLD AUTO: 11.6 FL (ref 6–12)
POC CREATININE URINE: 200
POC MICROALBUMIN URINE: 30
POTASSIUM BLD-SCNC: 4.4 MMOL/L (ref 3.5–5.5)
PROT SERPL-MCNC: 6.9 G/DL (ref 5.7–8.2)
PROT UR STRIP-MCNC: NEGATIVE MG/DL
RBC # BLD AUTO: 5.07 10*6/MM3 (ref 4.2–5.76)
RBC # UR STRIP: ABNORMAL /UL
SODIUM BLD-SCNC: 132 MMOL/L (ref 132–146)
SP GR UR: 1.01 (ref 1–1.03)
TRIGL SERPL-MCNC: 537 MG/DL (ref 0–150)
TSH SERPL DL<=0.05 MIU/L-ACNC: 1.42 MIU/ML (ref 0.35–5.35)
UROBILINOGEN UR QL: NORMAL
WBC NRBC COR # BLD: 6.24 10*3/MM3 (ref 3.5–10.8)

## 2017-10-30 PROCEDURE — 82962 GLUCOSE BLOOD TEST: CPT | Performed by: NURSE PRACTITIONER

## 2017-10-30 PROCEDURE — 83525 ASSAY OF INSULIN: CPT | Performed by: NURSE PRACTITIONER

## 2017-10-30 PROCEDURE — 83036 HEMOGLOBIN GLYCOSYLATED A1C: CPT | Performed by: NURSE PRACTITIONER

## 2017-10-30 PROCEDURE — 81003 URINALYSIS AUTO W/O SCOPE: CPT | Performed by: NURSE PRACTITIONER

## 2017-10-30 PROCEDURE — 36415 COLL VENOUS BLD VENIPUNCTURE: CPT | Performed by: NURSE PRACTITIONER

## 2017-10-30 PROCEDURE — 84681 ASSAY OF C-PEPTIDE: CPT | Performed by: NURSE PRACTITIONER

## 2017-10-30 PROCEDURE — 99214 OFFICE O/P EST MOD 30 MIN: CPT | Performed by: NURSE PRACTITIONER

## 2017-10-30 PROCEDURE — 82044 UR ALBUMIN SEMIQUANTITATIVE: CPT | Performed by: NURSE PRACTITIONER

## 2017-10-30 PROCEDURE — 80053 COMPREHEN METABOLIC PANEL: CPT | Performed by: NURSE PRACTITIONER

## 2017-10-30 PROCEDURE — 85025 COMPLETE CBC W/AUTO DIFF WBC: CPT | Performed by: NURSE PRACTITIONER

## 2017-10-30 PROCEDURE — 80061 LIPID PANEL: CPT | Performed by: NURSE PRACTITIONER

## 2017-10-30 PROCEDURE — 84443 ASSAY THYROID STIM HORMONE: CPT | Performed by: NURSE PRACTITIONER

## 2017-10-30 PROCEDURE — 86337 INSULIN ANTIBODIES: CPT | Performed by: NURSE PRACTITIONER

## 2017-10-31 LAB
C PEPTIDE SERPL-MCNC: 1.9 NG/ML (ref 1.1–4.4)
INSULIN SERPL-ACNC: 8.9 UIU/ML (ref 2.6–24.9)

## 2017-11-01 LAB
GLUCOSE BLDC GLUCOMTR-MCNC: >599 MG/DL (ref 70–130)

## 2017-11-02 ENCOUNTER — TELEPHONE (OUTPATIENT)
Dept: FAMILY MEDICINE CLINIC | Facility: CLINIC | Age: 32
End: 2017-11-02

## 2017-11-02 NOTE — TELEPHONE ENCOUNTER
----- Message from CONSTANCE Warren sent at 11/2/2017  8:00 AM EDT -----  Please call and see how he is doing and what his blood sugars are running now. How is he doing on insulin?

## 2017-11-03 LAB — INSULIN AB SER-ACNC: <5 UU/ML

## 2017-11-03 NOTE — TELEPHONE ENCOUNTER
Patient reported his last blood glucose was 200 mg/dl today. Reports he is feeling better.   He has a question about continuing to take the metformin medication. I told him to continue until he follow back up with Shiloh NOLASCO  He is agreeable with the plan.

## 2017-11-04 ENCOUNTER — TELEPHONE (OUTPATIENT)
Dept: FAMILY MEDICINE CLINIC | Facility: CLINIC | Age: 32
End: 2017-11-04

## 2019-04-05 ENCOUNTER — HOSPITAL ENCOUNTER (EMERGENCY)
Facility: HOSPITAL | Age: 34
Discharge: HOME | End: 2019-04-05
Attending: EMERGENCY MEDICINE | Admitting: EMERGENCY MEDICINE

## 2019-04-05 VITALS
SYSTOLIC BLOOD PRESSURE: 129 MMHG | WEIGHT: 120 LBS | HEIGHT: 69 IN | RESPIRATION RATE: 18 BRPM | DIASTOLIC BLOOD PRESSURE: 63 MMHG | OXYGEN SATURATION: 98 % | HEART RATE: 72 BPM | BODY MASS INDEX: 17.77 KG/M2 | TEMPERATURE: 98.1 F

## 2019-04-05 DIAGNOSIS — I88.9 LYMPHADENITIS: Primary | ICD-10-CM

## 2019-04-05 PROCEDURE — 99283 EMERGENCY DEPT VISIT LOW MDM: CPT

## 2019-04-05 RX ORDER — CEPHALEXIN 500 MG/1
500 CAPSULE ORAL 4 TIMES DAILY
Qty: 40 CAPSULE | Refills: 0 | Status: SHIPPED | OUTPATIENT
Start: 2019-04-05 | End: 2019-10-01

## 2019-04-06 NOTE — ED PROVIDER NOTES
Subjective   Patient comes to the emergency part today complaining of having several small knots in the back right portion of his scalp.  They are mildly tender he had no fevers no chills no trauma to the scalp.  Patient denies any earache.  Patient reports he is a diabetic.  He has had no other complaints.  Patient states it does not really cause a headache but they have been sore.        History provided by:  Patient   used: No    Pain   Location:  Pain behind right ear painful   Severity:  Mild  Onset quality:  Gradual  Timing:  Constant  Progression:  Worsening  Chronicity:  New  Associated symptoms: no cough, no diarrhea, no ear pain, no fever, no rash and no sore throat        Review of Systems   Constitutional: Negative for chills and fever.   HENT: Negative for ear pain and sore throat.    Respiratory: Negative for cough.    Gastrointestinal: Negative for diarrhea.   Musculoskeletal: Negative for back pain and neck pain.   Skin: Negative for rash.   Psychiatric/Behavioral: Negative.    All other systems reviewed and are negative.      Past Medical History:   Diagnosis Date   • Diabetes mellitus (CMS/[facility])        No Known Allergies    History reviewed. No pertinent surgical history.    Family History   Problem Relation Age of Onset   • Diabetes Mother    • Hypertension Father    • Diabetes Other        Social History     Socioeconomic History   • Marital status: Single     Spouse name: Not on file   • Number of children: Not on file   • Years of education: Not on file   • Highest education level: Not on file   Tobacco Use   • Smoking status: Current Every Day Smoker     Packs/day: 0.50     Years: 6.00     Pack years: 3.00     Types: Cigarettes   • Smokeless tobacco: Never Used   Substance and Sexual Activity   • Alcohol use: No   • Drug use: No   • Sexual activity: Defer   Social History Narrative    Independent, current occupation  at a gas station.            Objective    Physical Exam   Constitutional: He is oriented to person, place, and time. He appears well-developed and well-nourished.   HENT:   Head: Normocephalic and atraumatic.   Right Ear: External ear normal.   Left Ear: External ear normal.   Nose: Nose normal.   Mouth/Throat: Oropharynx is clear and moist.   Painful non-fixed shotty masses.  There is approximate 3 of them.  There is no redness or induration.  No edema over the mastoid sinus.   Eyes: Conjunctivae are normal. No scleral icterus.   Neck: Normal range of motion. No thyromegaly present.   Cardiovascular: Normal rate, regular rhythm and normal heart sounds.   Pulmonary/Chest: Effort normal and breath sounds normal. No respiratory distress. He has no wheezes. He has no rales. He exhibits no tenderness.   Abdominal: Soft. Bowel sounds are normal. He exhibits no distension. There is no tenderness.   Musculoskeletal: Normal range of motion.   Lymphadenopathy:     He has no cervical adenopathy.   Neurological: He is alert and oriented to person, place, and time. He has normal reflexes. He displays normal reflexes. No cranial nerve deficit. Coordination normal.   Skin: Skin is warm and dry.   Psychiatric: He has a normal mood and affect. His behavior is normal. Judgment and thought content normal.   Nursing note and vitals reviewed.      Procedures           ED Course                  MDM  Number of Diagnoses or Management Options  Lymphadenitis: new and requires workup     Amount and/or Complexity of Data Reviewed  Discuss the patient with other providers: yes    Patient Progress  Patient progress: stable        Final diagnoses:   Lymphadenitis            Yahir Nieto PA  04/09/19 0828

## 2019-10-01 ENCOUNTER — OFFICE VISIT (OUTPATIENT)
Dept: FAMILY MEDICINE CLINIC | Facility: CLINIC | Age: 34
End: 2019-10-01

## 2019-10-01 ENCOUNTER — HOSPITAL ENCOUNTER (EMERGENCY)
Facility: HOSPITAL | Age: 34
Discharge: HOME | End: 2019-10-01
Attending: EMERGENCY MEDICINE | Admitting: EMERGENCY MEDICINE

## 2019-10-01 VITALS
HEIGHT: 69 IN | SYSTOLIC BLOOD PRESSURE: 143 MMHG | HEART RATE: 75 BPM | DIASTOLIC BLOOD PRESSURE: 78 MMHG | RESPIRATION RATE: 16 BRPM | TEMPERATURE: 98.5 F | BODY MASS INDEX: 19.4 KG/M2 | WEIGHT: 131 LBS | OXYGEN SATURATION: 99 %

## 2019-10-01 VITALS
OXYGEN SATURATION: 98 % | SYSTOLIC BLOOD PRESSURE: 122 MMHG | DIASTOLIC BLOOD PRESSURE: 60 MMHG | WEIGHT: 131 LBS | RESPIRATION RATE: 16 BRPM | TEMPERATURE: 97.3 F | BODY MASS INDEX: 19.4 KG/M2 | HEART RATE: 62 BPM | HEIGHT: 69 IN

## 2019-10-01 DIAGNOSIS — Z91.199 NONCOMPLIANCE WITH THERAPEUTIC PLAN: ICD-10-CM

## 2019-10-01 DIAGNOSIS — R73.9 HYPERGLYCEMIA: Primary | ICD-10-CM

## 2019-10-01 DIAGNOSIS — IMO0002 UNCONTROLLED TYPE 2 DIABETES MELLITUS WITH COMPLICATION, WITHOUT LONG-TERM CURRENT USE OF INSULIN: Primary | ICD-10-CM

## 2019-10-01 DIAGNOSIS — Z86.39 HISTORY OF DIABETES MELLITUS, TYPE II: ICD-10-CM

## 2019-10-01 LAB
ALBUMIN SERPL-MCNC: 4.3 G/DL (ref 3.5–5.2)
ALBUMIN/GLOB SERPL: 1.6 G/DL
ALP SERPL-CCNC: 69 U/L (ref 39–117)
ALT SERPL W P-5'-P-CCNC: 10 U/L (ref 1–41)
ANION GAP SERPL CALCULATED.3IONS-SCNC: 8 MMOL/L (ref 5–15)
AST SERPL-CCNC: 12 U/L (ref 1–40)
B-OH-BUTYR SERPL-SCNC: 0.09 MMOL/L (ref 0.02–0.27)
BACTERIA UR QL AUTO: ABNORMAL /HPF
BASOPHILS # BLD AUTO: 0.03 10*3/MM3 (ref 0–0.2)
BASOPHILS NFR BLD AUTO: 0.5 % (ref 0–1.5)
BILIRUB BLD-MCNC: NEGATIVE MG/DL
BILIRUB SERPL-MCNC: 0.3 MG/DL (ref 0.2–1.2)
BILIRUB UR QL STRIP: NEGATIVE
BUN BLD-MCNC: 7 MG/DL (ref 6–20)
BUN/CREAT SERPL: 9.6 (ref 7–25)
CALCIUM SPEC-SCNC: 9.6 MG/DL (ref 8.6–10.5)
CHLORIDE SERPL-SCNC: 100 MMOL/L (ref 98–107)
CK SERPL-CCNC: 121 U/L (ref 20–200)
CLARITY UR: CLEAR
CLARITY, POC: CLEAR
CO2 SERPL-SCNC: 30 MMOL/L (ref 22–29)
COLOR UR: YELLOW
COLOR UR: YELLOW
CREAT BLD-MCNC: 0.73 MG/DL (ref 0.76–1.27)
D-LACTATE SERPL-SCNC: 0.8 MMOL/L (ref 0.5–2)
DEPRECATED RDW RBC AUTO: 38.6 FL (ref 37–54)
EOSINOPHIL # BLD AUTO: 0.12 10*3/MM3 (ref 0–0.4)
EOSINOPHIL NFR BLD AUTO: 2.2 % (ref 0.3–6.2)
ERYTHROCYTE [DISTWIDTH] IN BLOOD BY AUTOMATED COUNT: 12.5 % (ref 12.3–15.4)
GFR SERPL CREATININE-BSD FRML MDRD: 148 ML/MIN/1.73
GLOBULIN UR ELPH-MCNC: 2.7 GM/DL
GLUCOSE BLD-MCNC: 356 MG/DL (ref 65–99)
GLUCOSE BLDC GLUCOMTR-MCNC: 0 MG/DL (ref 70–130)
GLUCOSE BLDC GLUCOMTR-MCNC: 182 MG/DL (ref 70–130)
GLUCOSE UR STRIP-MCNC: ABNORMAL MG/DL
GLUCOSE UR STRIP-MCNC: ABNORMAL MG/DL
HBA1C MFR BLD: 14.6 %
HCT VFR BLD AUTO: 41.2 % (ref 37.5–51)
HGB BLD-MCNC: 13.2 G/DL (ref 13–17.7)
HGB UR QL STRIP.AUTO: NEGATIVE
HYALINE CASTS UR QL AUTO: ABNORMAL /LPF
IMM GRANULOCYTES # BLD AUTO: 0.01 10*3/MM3 (ref 0–0.05)
IMM GRANULOCYTES NFR BLD AUTO: 0.2 % (ref 0–0.5)
KETONES UR QL STRIP: NEGATIVE
KETONES UR QL: NEGATIVE
LEUKOCYTE EST, POC: ABNORMAL
LEUKOCYTE ESTERASE UR QL STRIP.AUTO: ABNORMAL
LIPASE SERPL-CCNC: 25 U/L (ref 13–60)
LYMPHOCYTES # BLD AUTO: 2.88 10*3/MM3 (ref 0.7–3.1)
LYMPHOCYTES NFR BLD AUTO: 51.9 % (ref 19.6–45.3)
MAGNESIUM SERPL-MCNC: 1.9 MG/DL (ref 1.6–2.6)
MCH RBC QN AUTO: 27.3 PG (ref 26.6–33)
MCHC RBC AUTO-ENTMCNC: 32 G/DL (ref 31.5–35.7)
MCV RBC AUTO: 85.3 FL (ref 79–97)
MONOCYTES # BLD AUTO: 0.32 10*3/MM3 (ref 0.1–0.9)
MONOCYTES NFR BLD AUTO: 5.8 % (ref 5–12)
NEUTROPHILS # BLD AUTO: 2.19 10*3/MM3 (ref 1.7–7)
NEUTROPHILS NFR BLD AUTO: 39.4 % (ref 42.7–76)
NITRITE UR QL STRIP: NEGATIVE
NITRITE UR-MCNC: NEGATIVE MG/ML
NRBC BLD AUTO-RTO: 0 /100 WBC (ref 0–0.2)
PH UR STRIP.AUTO: 5.5 [PH] (ref 5–8)
PH UR: 6 [PH] (ref 5–8)
PLATELET # BLD AUTO: 250 10*3/MM3 (ref 140–450)
PMV BLD AUTO: 9.4 FL (ref 6–12)
POTASSIUM BLD-SCNC: 4.1 MMOL/L (ref 3.5–5.2)
PROT SERPL-MCNC: 7 G/DL (ref 6–8.5)
PROT UR QL STRIP: NEGATIVE
PROT UR STRIP-MCNC: NEGATIVE MG/DL
RBC # BLD AUTO: 4.83 10*6/MM3 (ref 4.14–5.8)
RBC # UR STRIP: ABNORMAL /UL
RBC # UR: ABNORMAL /HPF
REF LAB TEST METHOD: ABNORMAL
SODIUM BLD-SCNC: 138 MMOL/L (ref 136–145)
SP GR UR STRIP: 1.03 (ref 1–1.03)
SP GR UR: 1.02 (ref 1–1.03)
SQUAMOUS #/AREA URNS HPF: ABNORMAL /HPF
UROBILINOGEN UR QL STRIP: ABNORMAL
UROBILINOGEN UR QL: NORMAL
WBC NRBC COR # BLD: 5.55 10*3/MM3 (ref 3.4–10.8)
WBC UR QL AUTO: ABNORMAL /HPF

## 2019-10-01 PROCEDURE — 83735 ASSAY OF MAGNESIUM: CPT | Performed by: NURSE PRACTITIONER

## 2019-10-01 PROCEDURE — 82550 ASSAY OF CK (CPK): CPT | Performed by: NURSE PRACTITIONER

## 2019-10-01 PROCEDURE — 63710000001 INSULIN REGULAR HUMAN PER 5 UNITS: Performed by: NURSE PRACTITIONER

## 2019-10-01 PROCEDURE — 96360 HYDRATION IV INFUSION INIT: CPT

## 2019-10-01 PROCEDURE — 83690 ASSAY OF LIPASE: CPT | Performed by: NURSE PRACTITIONER

## 2019-10-01 PROCEDURE — 99284 EMERGENCY DEPT VISIT MOD MDM: CPT

## 2019-10-01 PROCEDURE — 83036 HEMOGLOBIN GLYCOSYLATED A1C: CPT | Performed by: NURSE PRACTITIONER

## 2019-10-01 PROCEDURE — 83605 ASSAY OF LACTIC ACID: CPT | Performed by: NURSE PRACTITIONER

## 2019-10-01 PROCEDURE — 99214 OFFICE O/P EST MOD 30 MIN: CPT | Performed by: NURSE PRACTITIONER

## 2019-10-01 PROCEDURE — 80053 COMPREHEN METABOLIC PANEL: CPT | Performed by: NURSE PRACTITIONER

## 2019-10-01 PROCEDURE — 82962 GLUCOSE BLOOD TEST: CPT

## 2019-10-01 PROCEDURE — 85025 COMPLETE CBC W/AUTO DIFF WBC: CPT | Performed by: NURSE PRACTITIONER

## 2019-10-01 PROCEDURE — 81001 URINALYSIS AUTO W/SCOPE: CPT | Performed by: NURSE PRACTITIONER

## 2019-10-01 PROCEDURE — 82010 KETONE BODYS QUAN: CPT | Performed by: NURSE PRACTITIONER

## 2019-10-01 PROCEDURE — 82962 GLUCOSE BLOOD TEST: CPT | Performed by: NURSE PRACTITIONER

## 2019-10-01 RX ORDER — SODIUM CHLORIDE 0.9 % (FLUSH) 0.9 %
10 SYRINGE (ML) INJECTION AS NEEDED
Status: DISCONTINUED | OUTPATIENT
Start: 2019-10-01 | End: 2019-10-01 | Stop reason: HOSPADM

## 2019-10-01 RX ADMIN — SODIUM CHLORIDE, PRESERVATIVE FREE 10 ML: 5 INJECTION INTRAVENOUS at 19:04

## 2019-10-01 RX ADMIN — INSULIN HUMAN 6 UNITS: 100 INJECTION, SOLUTION PARENTERAL at 20:07

## 2019-10-01 RX ADMIN — SODIUM CHLORIDE 2000 ML: 9 INJECTION, SOLUTION INTRAVENOUS at 19:01

## 2019-10-01 NOTE — ED PROVIDER NOTES
Subjective   Patient presents to the emergency department at the direction of his primary care provider who saw him today for the first time since 2017.  Patient states that he has not had insulin or metformin in over 6 months.  (Review of his medical record from today: Dr. Miles reports he has not had insulin in over a year).  Patient states that his noncompliance is directly related to loss of his insurance temporarily.  His medical card has been resumed and he sought today to reestablish primary care.    Patient's blood glucose was found to be too high for metered read in the office.  Hemoglobin A1c in the office was more than 14.  Patient states that he has been feeling well in spite of this.  He denies any polyuria, polydipsia or weight loss.    Patient was diagnosed with diabetes and ketoacidosis in 2017.  He has no history of heart disease or renal insufficiency thus far.  He denies any visual changes        History provided by:  Patient   used: No    Hyperglycemia   Blood sugar level PTA:  Too high to measure  Severity:  Severe  Onset quality:  Gradual  Duration: Patient does not know how long he has had high blood sugar.  He has no meter at home.  Timing:  Unable to specify  Progression:  Unable to specify  Chronicity:  Recurrent  Current diabetic treatments: Type II diabetic insulin-dependent diagnosed 2017.  Current diabetic therapy:  Insulin and metformin  Context: noncompliance    Context: not new diabetes diagnosis    Relieved by:  Nothing  Ineffective treatments:  None tried  Associated symptoms: no abdominal pain, no altered mental status, no blurred vision, no chest pain, no confusion, no dehydration, no diaphoresis, no dizziness, no dysuria, no fatigue, no fever, no increased appetite, no increased thirst, no malaise, no polyuria, no shortness of breath, no syncope and no vomiting    Risk factors: family hx of diabetes and hx of DKA (2017)    Risk factors: no obesity and no  pancreatic disease        Review of Systems   Constitutional: Negative for diaphoresis, fatigue and fever.   Eyes: Negative for blurred vision.   Respiratory: Negative for shortness of breath.    Cardiovascular: Negative for chest pain and syncope.   Gastrointestinal: Negative for abdominal pain and vomiting.   Endocrine: Negative for cold intolerance, heat intolerance, polydipsia, polyphagia and polyuria.   Genitourinary: Negative for dysuria.   Neurological: Negative for dizziness.   Hematological: Negative.    Psychiatric/Behavioral: Negative.  Negative for confusion.   All other systems reviewed and are negative.      Past Medical History:   Diagnosis Date   • Diabetes mellitus (CMS/HCC)        No Known Allergies    History reviewed. No pertinent surgical history.    Family History   Problem Relation Age of Onset   • Diabetes Mother    • Hypertension Father    • Diabetes Other        Social History     Socioeconomic History   • Marital status: Single     Spouse name: Not on file   • Number of children: Not on file   • Years of education: Not on file   • Highest education level: Not on file   Tobacco Use   • Smoking status: Current Every Day Smoker     Packs/day: 0.50     Years: 6.00     Pack years: 3.00     Types: Cigarettes   • Smokeless tobacco: Never Used   Substance and Sexual Activity   • Alcohol use: No   • Drug use: No   • Sexual activity: Defer   Social History Narrative    Independent, current occupation  at a gas station.            Objective   Physical Exam   Constitutional: He is oriented to person, place, and time. He appears well-developed and well-nourished. No distress.   HENT:   Head: Normocephalic and atraumatic.   Eyes: Conjunctivae are normal. Pupils are equal, round, and reactive to light.   Neck: Normal range of motion. Neck supple. No JVD present.   Cardiovascular: Normal rate and regular rhythm.   Pulmonary/Chest: Effort normal and breath sounds normal. He has no wheezes.  He has no rales.   Abdominal: Soft. Bowel sounds are normal. He exhibits no distension. There is no rebound and no guarding.   Musculoskeletal: Normal range of motion. He exhibits no edema.   Neurological: He is alert and oriented to person, place, and time.   Skin: Skin is warm and dry. Capillary refill takes less than 2 seconds. He is not diaphoretic.   Psychiatric: He has a normal mood and affect. His behavior is normal. Thought content normal.   Nursing note and vitals reviewed.      Procedures           ED Course  ED Course as of Oct 01 2119   Tue Oct 01, 2019   1923 WBC, UA: (!) 31-50 [MS]   1923 Glucose: (!) >=1000 mg/dL (3+) [MS]   2115 Patient's workup is reassuring;  blood glucose is now below 200. Anion gap is normal.  Betahydroxybuterate is normal.  Kidney function is normal.  He can take and tolerate po fluids.  We have discussed compliance and follow up.  His PCP has provided refills of his medications he can  at his [facility] pharmacy tomorrow.  He will acquire a glucometer and maintain better care.    [MS]      ED Course User Index  [MS] Majo Corral, CONSTANCE      Recent Results (from the past 24 hour(s))   POC Glucose    Collection Time: 10/01/19  6:10 PM   Result Value Ref Range    Glucose 0 (A) 70 - 130 mg/dL   POC Urinalysis Dipstick, Automated    Collection Time: 10/01/19  6:11 PM   Result Value Ref Range    Color Yellow Yellow, Straw, Dark Yellow, Della    Clarity, UA Clear Clear    Specific Gravity  1.020 1.005 - 1.030    pH, Urine 6.0 5.0 - 8.0    Leukocytes Trace (A) Negative    Nitrite, UA Negative Negative    Protein, POC Negative Negative mg/dL    Glucose, UA >=1000 mg/dL (3+) (A) Negative, 1000 mg/dL (3+) mg/dL    Ketones, UA Negative Negative    Urobilinogen, UA Normal Normal    Bilirubin Negative Negative    Blood, UA Trace (A) Negative   POC Glycosylated Hemoglobin (Hb A1C)    Collection Time: 10/01/19  6:11 PM   Result Value Ref Range    Hemoglobin A1C 14.6 %   Urinalysis  With Microscopic If Indicated (No Culture) - Urine, Clean Catch    Collection Time: 10/01/19  6:53 PM   Result Value Ref Range    Color, UA Yellow Yellow, Straw    Appearance, UA Clear Clear    pH, UA 5.5 5.0 - 8.0    Specific Gravity, UA 1.033 (H) 1.001 - 1.030    Glucose, UA >=1000 mg/dL (3+) (A) Negative    Ketones, UA Negative Negative    Bilirubin, UA Negative Negative    Blood, UA Negative Negative    Protein, UA Negative Negative    Leuk Esterase, UA Small (1+) (A) Negative    Nitrite, UA Negative Negative    Urobilinogen, UA 0.2 E.U./dL 0.2 - 1.0 E.U./dL   Urinalysis, Microscopic Only - Urine, Clean Catch    Collection Time: 10/01/19  6:53 PM   Result Value Ref Range    RBC, UA None Seen None Seen, 0-2 /HPF    WBC, UA 31-50 (A) None Seen, 0-2 /HPF    Bacteria, UA None Seen None Seen, Trace /HPF    Squamous Epithelial Cells, UA 3-6 (A) None Seen, 0-2 /HPF    Hyaline Casts, UA 0-6 0 - 6 /LPF    Methodology Automated Microscopy    Comprehensive Metabolic Panel    Collection Time: 10/01/19  6:57 PM   Result Value Ref Range    Glucose 356 (H) 65 - 99 mg/dL    BUN 7 6 - 20 mg/dL    Creatinine 0.73 (L) 0.76 - 1.27 mg/dL    Sodium 138 136 - 145 mmol/L    Potassium 4.1 3.5 - 5.2 mmol/L    Chloride 100 98 - 107 mmol/L    CO2 30.0 (H) 22.0 - 29.0 mmol/L    Calcium 9.6 8.6 - 10.5 mg/dL    Total Protein 7.0 6.0 - 8.5 g/dL    Albumin 4.30 3.50 - 5.20 g/dL    ALT (SGPT) 10 1 - 41 U/L    AST (SGOT) 12 1 - 40 U/L    Alkaline Phosphatase 69 39 - 117 U/L    Total Bilirubin 0.3 0.2 - 1.2 mg/dL    eGFR  African Amer 148 >60 mL/min/1.73    Globulin 2.7 gm/dL    A/G Ratio 1.6 g/dL    BUN/Creatinine Ratio 9.6 7.0 - 25.0    Anion Gap 8.0 5.0 - 15.0 mmol/L   Lipase    Collection Time: 10/01/19  6:57 PM   Result Value Ref Range    Lipase 25 13 - 60 U/L   Lactic Acid, Plasma    Collection Time: 10/01/19  6:57 PM   Result Value Ref Range    Lactate 0.8 0.5 - 2.0 mmol/L   CK    Collection Time: 10/01/19  6:57 PM   Result Value Ref Range     Creatine Kinase 121 20 - 200 U/L   Magnesium    Collection Time: 10/01/19  6:57 PM   Result Value Ref Range    Magnesium 1.9 1.6 - 2.6 mg/dL   Beta Hydroxybutyrate Quantitative    Collection Time: 10/01/19  6:57 PM   Result Value Ref Range    Beta-Hydroxybutyrate Quant 0.092 0.020 - 0.270 mmol/L   CBC Auto Differential    Collection Time: 10/01/19  6:57 PM   Result Value Ref Range    WBC 5.55 3.40 - 10.80 10*3/mm3    RBC 4.83 4.14 - 5.80 10*6/mm3    Hemoglobin 13.2 13.0 - 17.7 g/dL    Hematocrit 41.2 37.5 - 51.0 %    MCV 85.3 79.0 - 97.0 fL    MCH 27.3 26.6 - 33.0 pg    MCHC 32.0 31.5 - 35.7 g/dL    RDW 12.5 12.3 - 15.4 %    RDW-SD 38.6 37.0 - 54.0 fl    MPV 9.4 6.0 - 12.0 fL    Platelets 250 140 - 450 10*3/mm3    Neutrophil % 39.4 (L) 42.7 - 76.0 %    Lymphocyte % 51.9 (H) 19.6 - 45.3 %    Monocyte % 5.8 5.0 - 12.0 %    Eosinophil % 2.2 0.3 - 6.2 %    Basophil % 0.5 0.0 - 1.5 %    Immature Grans % 0.2 0.0 - 0.5 %    Neutrophils, Absolute 2.19 1.70 - 7.00 10*3/mm3    Lymphocytes, Absolute 2.88 0.70 - 3.10 10*3/mm3    Monocytes, Absolute 0.32 0.10 - 0.90 10*3/mm3    Eosinophils, Absolute 0.12 0.00 - 0.40 10*3/mm3    Basophils, Absolute 0.03 0.00 - 0.20 10*3/mm3    Immature Grans, Absolute 0.01 0.00 - 0.05 10*3/mm3    nRBC 0.0 0.0 - 0.2 /100 WBC   POC Glucose Once    Collection Time: 10/01/19  8:59 PM   Result Value Ref Range    Glucose 182 (H) 70 - 130 mg/dL     Note: In addition to lab results from this visit, the labs listed above may include labs taken at another facility or during a different encounter within the last 24 hours. Please correlate lab times with ED admission and discharge times for further clarification of the services performed during this visit.    No orders to display     Vitals:    10/01/19 1724 10/01/19 2030   BP: 122/71 149/86   BP Location: Left arm    Patient Position: Sitting    Pulse: 75    Resp: 18    Temp: 98.5 °F (36.9 °C)    TempSrc: Oral    SpO2: 100% 100%   Weight: 59.4 kg (131  "lb)    Height: 175.3 cm (69\")      Medications   sodium chloride 0.9 % flush 10 mL (10 mL Intravenous Given 10/1/19 1904)   sodium chloride 0.9 % bolus 2,000 mL (2,000 mL Intravenous New Bag 10/1/19 1901)   insulin regular (humuLIN R,novoLIN R) injection 6 Units (6 Units Intravenous Given 10/1/19 2007)     ECG/EMG Results (last 24 hours)     ** No results found for the last 24 hours. **        No orders to display                   MDM    Final diagnoses:   Hyperglycemia   History of diabetes mellitus, type II   Noncompliance with therapeutic plan              Majo Corral, APRN  10/01/19 9048    "

## 2019-10-01 NOTE — PATIENT INSTRUCTIONS
Type 2 Diabetes Mellitus, Self Care, Adult  Caring for yourself after you have been diagnosed with type 2 diabetes (type 2 diabetes mellitus) means keeping your blood sugar (glucose) under control with a balance of:  · Nutrition.  · Exercise.  · Lifestyle changes.  · Medicines or insulin, if necessary.  · Support from your team of health care providers and others.  The following information explains what you need to know to manage your diabetes at home.  What are the risks?  Having diabetes can put you at risk for other long-term (chronic) conditions, such as heart disease and kidney disease. Your health care provider may prescribe medicines to help prevent complications from diabetes. These medicines may include:  · Aspirin.  · Medicine to lower cholesterol.  · Medicine to control blood pressure.  How to monitor blood glucose    · Check your blood glucose every day, as often as told by your health care provider.  · Have your A1c (hemoglobin A1c) level checked two or more times a year, or as often as told by your health care provider.  Your health care provider will set individualized treatment goals for you. Generally, the goal of treatment is to maintain the following blood glucose levels:  · Before meals (preprandial):  mg/dL (4.4-7.2 mmol/L).  · After meals (postprandial): below 180 mg/dL (10 mmol/L).  · A1c level: less than 7%.  How to manage hyperglycemia and hypoglycemia  Hyperglycemia symptoms  Hyperglycemia, also called high blood glucose, occurs when blood glucose is too high. Make sure you know the early signs of hyperglycemia, such as:  · Increased thirst.  · Hunger.  · Feeling very tired.  · Needing to urinate more often than usual.  · Blurry vision.  Hypoglycemia symptoms  Hypoglycemia, also called low blood glucose, occurs with a blood glucose level at or below 70 mg/dL (3.9 mmol/L). The risk for hypoglycemia increases during or after exercise, during sleep, during illness, and when skipping  meals or not eating for a long time (fasting).  It is important to know the symptoms of hypoglycemia and treat it right away. Always have a 15-gram rapid-acting carbohydrate snack with you to treat low blood glucose. Family members and close friends should also know the symptoms and should understand how to treat hypoglycemia, in case you are not able to treat yourself. Symptoms may include:  · Hunger.  · Anxiety.  · Sweating and feeling clammy.  · Confusion.  · Dizziness or feeling light-headed.  · Sleepiness.  · Nausea.  · Increased heart rate.  · Headache.  · Blurry vision.  · Irritability.  · A change in coordination.  · Tingling or numbness around the mouth, lips, or tongue.  · Restless sleep.  · Fainting.  · Seizure.  Treating hypoglycemia  If you are alert and able to swallow safely, follow the 15:15 rule:  · Take 15 grams of a rapid-acting carbohydrate. Rapid-acting options include:  ? 1 tube of glucose gel.  ? 3 glucose pills.  ? 6-8 pieces of hard candy.  ? 4 oz (120 mL) of fruit juice.  ? 4 oz (120 mL) of regular (not diet) soda.  · Check your blood glucose 15 minutes after you take the carbohydrate.  · If the repeat blood glucose level is still at or below 70 mg/dL (3.9 mmol/L), take 15 grams of a carbohydrate again.  · If your blood glucose level does not increase above 70 mg/dL (3.9 mmol/L) after 3 tries, seek emergency medical care.  · After your blood glucose level returns to normal, eat a meal or a snack within 1 hour.  Treating severe hypoglycemia  Severe hypoglycemia is when your blood glucose level is at or below 54 mg/dL (3 mmol/L). Severe hypoglycemia is an emergency. Do not wait to see if the symptoms will go away. Get medical help right away. Call your local emergency services (911 in the [place]  If you have severe hypoglycemia and you cannot eat or drink, you may need an injection of glucagon. A family member or close friend should learn how to check your blood glucose and how to give you a  glucagon injection. Ask your health care provider if you need to have an emergency glucagon injection kit available.  Severe hypoglycemia may need to be treated in a hospital. The treatment may include getting glucose through an IV. You may also need treatment for the cause of your hypoglycemia.  Follow these instructions at home:  Take diabetes medicines as told  · If your health care provider prescribed insulin or diabetes medicines, take them every day.  · Do not run out of insulin or other diabetes medicines that you take. Plan ahead so you always have these available.  · If you use insulin, adjust your dosage based on how physically active you are and what foods you eat. Your health care provider will tell you how to adjust your dosage.  Make healthy food choices    The things that you eat and drink affect your blood glucose and your insulin dosage. Making good choices helps to control your diabetes and prevent other health problems. A healthy meal plan includes eating lean proteins, complex carbohydrates, fresh fruits and vegetables, low-fat dairy products, and healthy fats.  Make an appointment to see a diet and nutrition specialist (registered dietitian) to help you create an eating plan that is right for you. Make sure that you:  · Follow instructions from your health care provider about eating or drinking restrictions.  · Drink enough fluid to keep your urine pale yellow.  · Keep a record of the carbohydrates that you eat. Do this by reading food labels and learning the standard serving sizes of foods.  · Follow your sick day plan whenever you cannot eat or drink as usual. Make this plan in advance with your health care provider.    Stay active  Exercise regularly, as told by your health care provider. This may include:  · Stretching and doing strength exercises, such as yoga or weightlifting, 2 or more times a week.  · Doing 150 minutes or more of moderate-intensity or vigorous-intensity exercise each  week. This could be brisk walking, biking, or water aerobics.  ? Spread out your activity over 3 or more days of the week.  ? Do not go more than 2 days in a row without doing some kind of physical activity.  When you start a new exercise or activity, work with your health care provider to adjust your insulin, medicines, or food intake as needed.  Make healthy lifestyle choices  · Do not use any tobacco products, such as cigarettes, chewing tobacco, and e-cigarettes. If you need help quitting, ask your health care provider.  · If your health care provider says that alcohol is safe for you, limit alcohol intake to no more than 1 drink per day for nonpregnant women and 2 drinks per day for men. One drink equals 12 oz of beer, 5 oz of wine, or 1½ oz of hard liquor.  · Learn to manage stress. If you need help with this, ask your health care provider.  Care for your body    · Keep your immunizations up to date. In addition to getting vaccinations as told by your health care provider, it is recommended that you get vaccinated against the following illnesses:  ? The flu (influenza). Get a flu shot every year.  ? Pneumonia.  ? Hepatitis B.  · Schedule an eye exam soon after your diagnosis, and then one time every year after that.  · Check your skin and feet every day for cuts, bruises, redness, blisters, or sores. Schedule a foot exam with your health care provider once every year.  · Brush your teeth and gums two times a day, and floss one or more times a day. Visit your dentist one or more times every 6 months.  · Maintain a healthy weight.  General instructions  · Take over-the-counter and prescription medicines only as told by your health care provider.  · Share your diabetes management plan with people in your workplace, school, and household.  · Carry a medical alert card or wear medical alert jewelry.  · Keep all follow-up visits as told by your health care provider. This is important.  Questions to ask your health  care provider  · Do I need to meet with a diabetes educator?  · Where can I find a support group for people with diabetes?  Where to find more information  For more information about diabetes, visit:  · American Diabetes Association (ADA): www.diabetes.org  · American Association of Diabetes Educators (AADE): www.diabeteseducator.org  Summary  · Caring for yourself after you have been diagnosed with (type 2 diabetes mellitus) means keeping your blood sugar (glucose) under control with a balance of nutrition, exercise, lifestyle changes, and medicine.  · Check your blood glucose every day, as often as told by your health care provider.  · Having diabetes can put you at risk for other long-term (chronic) conditions, such as heart disease and kidney disease. Your health care provider may prescribe medicines to help prevent complications from diabetes.  · Keep all follow-up visits as told by your health care provider. This is important.  This information is not intended to replace advice given to you by your health care provider. Make sure you discuss any questions you have with your health care provider.  Document Released: 04/10/2017 Document Revised: 07/20/2018 Document Reviewed: 01/20/2017  [employer] Interactive Patient Education © 2019 [employer]

## 2019-10-01 NOTE — PROGRESS NOTES
Subjective   Sherwin Lund is a 33 y.o. male.     Mr. Lund presents today for re-evaluation/management DM2. Patient states that he lost his insurance and has been out of his insulin and metformin for about a year. His last office visit with me was 10/30/17. He was referred to endocrinology but did not keep appointment. Patient has a history of DKA and renal insufficiency as well.      Diabetes   He presents for his follow-up diabetic visit. He has type 2 diabetes mellitus. No MedicAlert identification noted. His disease course has been worsening. There are no hypoglycemic associated symptoms. Associated symptoms include fatigue and weight loss. Pertinent negatives for diabetes include no blurred vision, no chest pain, no foot paresthesias, no polydipsia, no polyuria, no visual change and no weakness. Risk factors for coronary artery disease include male sex, family history and diabetes mellitus. Current diabetic treatment includes oral agent (monotherapy) and insulin injections. He is compliant with treatment none of the time. His weight is decreasing steadily. He is following a generally unhealthy diet. When asked about meal planning, he reported none. He has had a previous visit with a dietitian. An ACE inhibitor/angiotensin II receptor blocker is not being taken. He does not see a podiatrist.Eye exam is not current.       The following portions of the patient's history were reviewed and updated as appropriate: allergies, current medications, past family history, past medical history, past social history, past surgical history and problem list.    Allergies as of 10/01/2019   • (No Known Allergies)       Current Outpatient Medications on File Prior to Visit   Medication Sig   • insulin lispro (humaLOG) 100 UNIT/ML injection Inject 1 Units under the skin into the appropriate area as directed 3 (Three) Times a Day Before Meals. SLIDING SCALE   • Insulin Pen Needle 32G X 5 MM misc Use one pen needle daily for  basaglar injection   • metFORMIN (GLUCOPHAGE) 500 MG tablet Take 500 mg by mouth 2 (Two) Times a Day With Meals.   • [DISCONTINUED] cephalexin (KEFLEX) 500 MG capsule Take 1 capsule by mouth 4 (Four) Times a Day.     No current facility-administered medications on file prior to visit.        Review of Systems   Constitutional: Positive for fatigue, unexpected weight change and weight loss. Negative for activity change, appetite change, chills, diaphoresis and fever.   Eyes: Negative for blurred vision.   Respiratory: Negative.    Cardiovascular: Negative.  Negative for chest pain.   Gastrointestinal: Negative.    Endocrine: Negative for polydipsia and polyuria.   Genitourinary: Negative.    Musculoskeletal: Negative.    Skin: Negative.    Neurological: Negative.  Negative for weakness.       Objective   Physical Exam   Constitutional: He is oriented to person, place, and time. Vital signs are normal. He appears well-developed. He is cooperative. No distress.   HENT:   Right Ear: Tympanic membrane normal.   Left Ear: Tympanic membrane normal.   Mouth/Throat: Uvula is midline and mucous membranes are normal.   Cardiovascular: Normal rate, regular rhythm and normal heart sounds.   Pulmonary/Chest: Effort normal and breath sounds normal.   Abdominal: Soft. He exhibits no distension. There is no tenderness.   Neurological: He is alert and oriented to person, place, and time.   Skin: Skin is warm, dry and intact. No rash noted. He is not diaphoretic.   Psychiatric: He has a normal mood and affect. His behavior is normal. Judgment and thought content normal.   Vitals reviewed.    Vitals:    10/01/19 1540   BP: 122/60   Pulse: 62   Resp: 16   Temp: 97.3 °F (36.3 °C)   SpO2: 98%     Body mass index is 19.35 kg/m².    Assessment/Plan   Sherwin was seen today for diabetes and shoulder pain.    Diagnoses and all orders for this visit:    Uncontrolled type 2 diabetes mellitus with complication, without long-term current use of  insulin (CMS/[facility])  -     POC Urinalysis Dipstick, Automated  -     POC Glycosylated Hemoglobin (Hb A1C)  -     POC Glucose      Brief Urine Lab Results  (Last result in the past 365 days)      Color   Clarity   Blood   Leuk Est   Nitrite   Protein   CREAT   Urine HCG        10/01/19 1811 Yellow Clear Trace Trace Negative Negative             Glucose per office glucometer too high to read. POC hemoglobin A1c 14.6.  Advised patient to go to ER immediately due to hyperglycemia, ambulance called for transport.  After EMS arrival patient declined to go to hospital via ambulance. He states that he will drive himself home and get his brother to take him to [facility]. Refusal for ambulance transport form signed by patient (see scanned documents).   Report called to [facility], [place] by [facility].

## 2019-10-02 DIAGNOSIS — IMO0002 UNCONTROLLED TYPE 2 DIABETES MELLITUS WITH COMPLICATION, WITHOUT LONG-TERM CURRENT USE OF INSULIN: ICD-10-CM

## 2019-10-02 RX ORDER — INSULIN GLARGINE 100 [IU]/ML
10 INJECTION, SOLUTION SUBCUTANEOUS NIGHTLY
Qty: 3 PEN | Refills: 11 | Status: SHIPPED | OUTPATIENT
Start: 2019-10-02 | End: 2019-10-06 | Stop reason: SDUPTHER

## 2019-10-06 DIAGNOSIS — IMO0002 UNCONTROLLED TYPE 2 DIABETES MELLITUS WITH COMPLICATION, WITHOUT LONG-TERM CURRENT USE OF INSULIN: ICD-10-CM

## 2019-10-09 RX ORDER — INSULIN GLARGINE 100 [IU]/ML
10 INJECTION, SOLUTION SUBCUTANEOUS NIGHTLY
Qty: 3 PEN | Refills: 11 | OUTPATIENT
Start: 2019-10-09 | End: 2022-03-06

## 2019-11-30 ENCOUNTER — APPOINTMENT (OUTPATIENT)
Dept: CT IMAGING | Facility: HOSPITAL | Age: 34
End: 2019-11-30

## 2019-11-30 ENCOUNTER — HOSPITAL ENCOUNTER (EMERGENCY)
Facility: HOSPITAL | Age: 34
Discharge: HOME | End: 2019-11-30
Attending: EMERGENCY MEDICINE | Admitting: EMERGENCY MEDICINE

## 2019-11-30 VITALS
WEIGHT: 131 LBS | RESPIRATION RATE: 16 BRPM | HEIGHT: 69 IN | SYSTOLIC BLOOD PRESSURE: 120 MMHG | DIASTOLIC BLOOD PRESSURE: 60 MMHG | OXYGEN SATURATION: 99 % | BODY MASS INDEX: 19.4 KG/M2 | TEMPERATURE: 98.6 F | HEART RATE: 62 BPM

## 2019-11-30 DIAGNOSIS — R10.84 GENERALIZED ABDOMINAL PAIN: Primary | ICD-10-CM

## 2019-11-30 DIAGNOSIS — K59.00 CONSTIPATION, UNSPECIFIED CONSTIPATION TYPE: ICD-10-CM

## 2019-11-30 DIAGNOSIS — N39.0 URINARY TRACT INFECTION IN MALE: ICD-10-CM

## 2019-11-30 LAB
ALBUMIN SERPL-MCNC: 5.1 G/DL (ref 3.5–5.2)
ALBUMIN/GLOB SERPL: 1.6 G/DL
ALP SERPL-CCNC: 60 U/L (ref 39–117)
ALT SERPL W P-5'-P-CCNC: 23 U/L (ref 1–41)
ANION GAP SERPL CALCULATED.3IONS-SCNC: 12 MMOL/L (ref 5–15)
AST SERPL-CCNC: 16 U/L (ref 1–40)
B-OH-BUTYR SERPL-SCNC: 0.15 MMOL/L (ref 0.02–0.27)
BACTERIA UR QL AUTO: ABNORMAL /HPF
BASOPHILS # BLD AUTO: 0.05 10*3/MM3 (ref 0–0.2)
BASOPHILS NFR BLD AUTO: 0.9 % (ref 0–1.5)
BILIRUB SERPL-MCNC: 0.4 MG/DL (ref 0.2–1.2)
BILIRUB UR QL STRIP: NEGATIVE
BUN BLD-MCNC: 11 MG/DL (ref 6–20)
BUN/CREAT SERPL: 11.5 (ref 7–25)
CALCIUM SPEC-SCNC: 10.5 MG/DL (ref 8.6–10.5)
CHLORIDE SERPL-SCNC: 98 MMOL/L (ref 98–107)
CLARITY UR: ABNORMAL
CO2 SERPL-SCNC: 29 MMOL/L (ref 22–29)
COLOR UR: YELLOW
CREAT BLD-MCNC: 0.96 MG/DL (ref 0.76–1.27)
DEPRECATED RDW RBC AUTO: 39.7 FL (ref 37–54)
EOSINOPHIL # BLD AUTO: 0.09 10*3/MM3 (ref 0–0.4)
EOSINOPHIL NFR BLD AUTO: 1.7 % (ref 0.3–6.2)
ERYTHROCYTE [DISTWIDTH] IN BLOOD BY AUTOMATED COUNT: 12.8 % (ref 12.3–15.4)
GFR SERPL CREATININE-BSD FRML MDRD: 107 ML/MIN/1.73
GLOBULIN UR ELPH-MCNC: 3.1 GM/DL
GLUCOSE BLD-MCNC: 108 MG/DL (ref 65–99)
GLUCOSE UR STRIP-MCNC: NEGATIVE MG/DL
HCT VFR BLD AUTO: 46.3 % (ref 37.5–51)
HGB BLD-MCNC: 15 G/DL (ref 13–17.7)
HGB UR QL STRIP.AUTO: ABNORMAL
HOLD SPECIMEN: NORMAL
HOLD SPECIMEN: NORMAL
HYALINE CASTS UR QL AUTO: ABNORMAL /LPF
IMM GRANULOCYTES # BLD AUTO: 0 10*3/MM3 (ref 0–0.05)
IMM GRANULOCYTES NFR BLD AUTO: 0 % (ref 0–0.5)
KETONES UR QL STRIP: NEGATIVE
LEUKOCYTE ESTERASE UR QL STRIP.AUTO: ABNORMAL
LIPASE SERPL-CCNC: 22 U/L (ref 13–60)
LYMPHOCYTES # BLD AUTO: 2.87 10*3/MM3 (ref 0.7–3.1)
LYMPHOCYTES NFR BLD AUTO: 53.3 % (ref 19.6–45.3)
MCH RBC QN AUTO: 27.3 PG (ref 26.6–33)
MCHC RBC AUTO-ENTMCNC: 32.4 G/DL (ref 31.5–35.7)
MCV RBC AUTO: 84.2 FL (ref 79–97)
MONOCYTES # BLD AUTO: 0.38 10*3/MM3 (ref 0.1–0.9)
MONOCYTES NFR BLD AUTO: 7.1 % (ref 5–12)
MUCOUS THREADS URNS QL MICRO: ABNORMAL /HPF
NEUTROPHILS # BLD AUTO: 1.99 10*3/MM3 (ref 1.7–7)
NEUTROPHILS NFR BLD AUTO: 37 % (ref 42.7–76)
NITRITE UR QL STRIP: NEGATIVE
NRBC BLD AUTO-RTO: 0 /100 WBC (ref 0–0.2)
PH UR STRIP.AUTO: 6 [PH] (ref 5–8)
PLATELET # BLD AUTO: 318 10*3/MM3 (ref 140–450)
PMV BLD AUTO: 8.9 FL (ref 6–12)
POTASSIUM BLD-SCNC: 4 MMOL/L (ref 3.5–5.2)
PROT SERPL-MCNC: 8.2 G/DL (ref 6–8.5)
PROT UR QL STRIP: NEGATIVE
RBC # BLD AUTO: 5.5 10*6/MM3 (ref 4.14–5.8)
RBC # UR: ABNORMAL /HPF
REF LAB TEST METHOD: ABNORMAL
SODIUM BLD-SCNC: 139 MMOL/L (ref 136–145)
SP GR UR STRIP: 1.06 (ref 1–1.03)
SQUAMOUS #/AREA URNS HPF: ABNORMAL /HPF
UROBILINOGEN UR QL STRIP: ABNORMAL
WBC NRBC COR # BLD: 5.38 10*3/MM3 (ref 3.4–10.8)
WBC UR QL AUTO: ABNORMAL /HPF
WHOLE BLOOD HOLD SPECIMEN: NORMAL
WHOLE BLOOD HOLD SPECIMEN: NORMAL

## 2019-11-30 PROCEDURE — 99284 EMERGENCY DEPT VISIT MOD MDM: CPT

## 2019-11-30 PROCEDURE — 83690 ASSAY OF LIPASE: CPT

## 2019-11-30 PROCEDURE — 81001 URINALYSIS AUTO W/SCOPE: CPT

## 2019-11-30 PROCEDURE — 85025 COMPLETE CBC W/AUTO DIFF WBC: CPT

## 2019-11-30 PROCEDURE — 25010000002 IOPAMIDOL 61 % SOLUTION: Performed by: EMERGENCY MEDICINE

## 2019-11-30 PROCEDURE — 96375 TX/PRO/DX INJ NEW DRUG ADDON: CPT

## 2019-11-30 PROCEDURE — 25010000002 ONDANSETRON PER 1 MG: Performed by: EMERGENCY MEDICINE

## 2019-11-30 PROCEDURE — 87086 URINE CULTURE/COLONY COUNT: CPT | Performed by: NURSE PRACTITIONER

## 2019-11-30 PROCEDURE — 80053 COMPREHEN METABOLIC PANEL: CPT

## 2019-11-30 PROCEDURE — 74177 CT ABD & PELVIS W/CONTRAST: CPT

## 2019-11-30 PROCEDURE — 87077 CULTURE AEROBIC IDENTIFY: CPT | Performed by: NURSE PRACTITIONER

## 2019-11-30 PROCEDURE — 87186 SC STD MICRODIL/AGAR DIL: CPT | Performed by: NURSE PRACTITIONER

## 2019-11-30 PROCEDURE — 87491 CHLMYD TRACH DNA AMP PROBE: CPT | Performed by: NURSE PRACTITIONER

## 2019-11-30 PROCEDURE — 25010000002 MORPHINE PER 10 MG: Performed by: EMERGENCY MEDICINE

## 2019-11-30 PROCEDURE — 82010 KETONE BODYS QUAN: CPT | Performed by: NURSE PRACTITIONER

## 2019-11-30 PROCEDURE — 87591 N.GONORRHOEAE DNA AMP PROB: CPT | Performed by: NURSE PRACTITIONER

## 2019-11-30 PROCEDURE — 96374 THER/PROPH/DIAG INJ IV PUSH: CPT

## 2019-11-30 RX ORDER — MORPHINE SULFATE 4 MG/ML
4 INJECTION, SOLUTION INTRAMUSCULAR; INTRAVENOUS ONCE
Status: COMPLETED | OUTPATIENT
Start: 2019-11-30 | End: 2019-11-30

## 2019-11-30 RX ORDER — SODIUM CHLORIDE 0.9 % (FLUSH) 0.9 %
10 SYRINGE (ML) INJECTION AS NEEDED
Status: DISCONTINUED | OUTPATIENT
Start: 2019-11-30 | End: 2019-12-01 | Stop reason: HOSPADM

## 2019-11-30 RX ORDER — CEFDINIR 300 MG/1
300 CAPSULE ORAL 2 TIMES DAILY
Qty: 20 CAPSULE | Refills: 0 | OUTPATIENT
Start: 2019-11-30 | End: 2022-03-06

## 2019-11-30 RX ORDER — MAGNESIUM CARB/ALUMINUM HYDROX 105-160MG
296 TABLET,CHEWABLE ORAL ONCE
Status: COMPLETED | OUTPATIENT
Start: 2019-11-30 | End: 2019-11-30

## 2019-11-30 RX ORDER — ONDANSETRON 2 MG/ML
4 INJECTION INTRAMUSCULAR; INTRAVENOUS ONCE
Status: COMPLETED | OUTPATIENT
Start: 2019-11-30 | End: 2019-11-30

## 2019-11-30 RX ORDER — POLYETHYLENE GLYCOL 3350 17 G/17G
17 POWDER, FOR SOLUTION ORAL DAILY
Qty: 225 G | Refills: 0 | OUTPATIENT
Start: 2019-11-30 | End: 2022-03-06

## 2019-11-30 RX ADMIN — Medication 296 ML: at 22:15

## 2019-11-30 RX ADMIN — ONDANSETRON 4 MG: 2 INJECTION INTRAMUSCULAR; INTRAVENOUS at 20:13

## 2019-11-30 RX ADMIN — MORPHINE SULFATE 4 MG: 4 INJECTION, SOLUTION INTRAMUSCULAR; INTRAVENOUS at 20:14

## 2019-11-30 RX ADMIN — IOPAMIDOL 85 ML: 612 INJECTION, SOLUTION INTRAVENOUS at 19:50

## 2019-11-30 RX ADMIN — SODIUM CHLORIDE 1000 ML: 9 INJECTION, SOLUTION INTRAVENOUS at 20:13

## 2019-12-01 NOTE — ED PROVIDER NOTES
Subjective   Sherwin Lund is a 34 year old male complaining of abdominal pain. The pain is constant in his periumbilical region and began approximately two weeks ago. The patient also notes that the pain is present around the injection site for a medication he takes for his type II diabetes mellitus, though he does not know the name of the medication. He received his diagnosis for diabetes mellitus about two years ago, and reports that his blood sugar is typically between 112 and 160. He also notes nausea and constipation, but denies vomiting, fever, chest pain, shortness of breath, dysuria, and increased urinary frequency or urgency. His last bowel movement was three to four days ago. The patient tried taking Mylanta for his constipation, but his symptoms did not improve. He also takes Metformin and Lispro. The patient does not currently see an endocrinologist. There are no other acute complaints at this time.          History provided by:  Patient  Abdominal Pain   Pain location:  Periumbilical  Pain radiates to:  Does not radiate  Pain severity:  Moderate  Onset quality:  Sudden  Duration:  2 weeks  Timing:  Constant  Progression:  Unchanged  Chronicity:  New  Ineffective treatments:  Antacids  Associated symptoms: constipation and nausea    Associated symptoms: no chest pain, no dysuria, no fever, no shortness of breath and no vomiting        Review of Systems   Constitutional: Negative for fever.   Respiratory: Negative for shortness of breath.    Cardiovascular: Negative for chest pain.   Gastrointestinal: Positive for abdominal pain, constipation and nausea. Negative for vomiting.   Genitourinary: Negative for dysuria, frequency and urgency.   All other systems reviewed and are negative.      Past Medical History:   Diagnosis Date   • Diabetes mellitus (CMS/[facility])        No Known Allergies    History reviewed. No pertinent surgical history.    Family History   Problem Relation Age of Onset   • Diabetes  Mother    • Hypertension Father    • Diabetes Other        Social History     Socioeconomic History   • Marital status: Single     Spouse name: Not on file   • Number of children: Not on file   • Years of education: Not on file   • Highest education level: Not on file   Tobacco Use   • Smoking status: Current Every Day Smoker     Packs/day: 0.50     Years: 6.00     Pack years: 3.00     Types: Cigarettes   • Smokeless tobacco: Never Used   Substance and Sexual Activity   • Alcohol use: No   • Drug use: No   • Sexual activity: Defer   Social History Narrative    Independent, current occupation  at a gas station.          Objective   Physical Exam   Constitutional: He is oriented to person, place, and time. He appears well-developed and well-nourished. He is cooperative.  Non-toxic appearance. He does not appear ill.   HENT:   Head: Normocephalic and atraumatic.   Mouth/Throat: Oropharynx is clear and moist.   Eyes: Conjunctivae, EOM and lids are normal.   Neck: Trachea normal, normal range of motion and full passive range of motion without pain.   Cardiovascular: Regular rhythm, normal heart sounds, intact distal pulses and normal pulses.   Pulmonary/Chest: Effort normal and breath sounds normal. No respiratory distress. He has no decreased breath sounds. He has no wheezes. He has no rhonchi. He has no rales.   Abdominal: Soft. Normal appearance and bowel sounds are normal. There is generalized tenderness.   Musculoskeletal: Normal range of motion.   Neurological: He is alert and oriented to person, place, and time. He has normal strength. No cranial nerve deficit.   Skin: Skin is warm, dry and intact. No rash noted.   Psychiatric: He has a normal mood and affect. His speech is normal and behavior is normal.   Nursing note and vitals reviewed.      Procedures         ED Course  ED Course as of Dec 01 0149   Sat Nov 30, 2019 2007 Beta-Hydroxybutyrate Quant: 0.149 [KG]   2007 WBC: 5.38 [KG]   2007  Lipase: 22 [KG]   2007 Glucose: (!) 108 [KG]   2238 Specific Gravity, UA: (!) 1.063 [KG]   2238 Leukocytes, UA: (!) Moderate (2+) [KG]   2238 Squamous Epithelial Cells, UA: (!) 7-12 [KG]   2238 Urine sent for culture and GC/ Chlamydia testing.  WBC, UA: (!) Too Numerous to Count [KG]   2238 Bacteria, UA: Trace [KG]   2254 Patient is advised the results at this time.  PT was offered an enema, he declined.  Pt will be given a dose of Magnesium Citrate.  Pt will be dc home. Pt to take meds as ordered. Pt to f/u with PCP. Pt agrees and verb understanding.  [KG]      ED Course User Index  [KG] Karely Kaur, CONSTANCE     Recent Results (from the past 24 hour(s))   Urinalysis With Microscopic If Indicated (No Culture) - Urine, Clean Catch    Collection Time: 11/30/19  7:15 PM   Result Value Ref Range    Color, UA Yellow Yellow, Straw    Appearance, UA Cloudy (A) Clear    pH, UA 6.0 5.0 - 8.0    Specific Gravity, UA 1.063 (H) 1.001 - 1.030    Glucose, UA Negative Negative    Ketones, UA Negative Negative    Bilirubin, UA Negative Negative    Blood, UA Trace (A) Negative    Protein, UA Negative Negative    Leuk Esterase, UA Moderate (2+) (A) Negative    Nitrite, UA Negative Negative    Urobilinogen, UA 0.2 E.U./dL 0.2 - 1.0 E.U./dL   Urinalysis, Microscopic Only - Urine, Clean Catch    Collection Time: 11/30/19  7:15 PM   Result Value Ref Range    RBC, UA 0-2 None Seen, 0-2 /HPF    WBC, UA Too Numerous to Count (A) None Seen, 0-2 /HPF    Bacteria, UA Trace None Seen, Trace /HPF    Squamous Epithelial Cells, UA 7-12 (A) None Seen, 0-2 /HPF    Hyaline Casts, UA None Seen 0 - 6 /LPF    Mucus, UA Trace None Seen, Trace /HPF    Methodology Manual Light Microscopy    Comprehensive Metabolic Panel    Collection Time: 11/30/19  7:19 PM   Result Value Ref Range    Glucose 108 (H) 65 - 99 mg/dL    BUN 11 6 - 20 mg/dL    Creatinine 0.96 0.76 - 1.27 mg/dL    Sodium 139 136 - 145 mmol/L    Potassium 4.0 3.5 - 5.2 mmol/L    Chloride 98  98 - 107 mmol/L    CO2 29.0 22.0 - 29.0 mmol/L    Calcium 10.5 8.6 - 10.5 mg/dL    Total Protein 8.2 6.0 - 8.5 g/dL    Albumin 5.10 3.50 - 5.20 g/dL    ALT (SGPT) 23 1 - 41 U/L    AST (SGOT) 16 1 - 40 U/L    Alkaline Phosphatase 60 39 - 117 U/L    Total Bilirubin 0.4 0.2 - 1.2 mg/dL    eGFR  African Amer 107 >60 mL/min/1.73    Globulin 3.1 gm/dL    A/G Ratio 1.6 g/dL    BUN/Creatinine Ratio 11.5 7.0 - 25.0    Anion Gap 12.0 5.0 - 15.0 mmol/L   Lipase    Collection Time: 11/30/19  7:19 PM   Result Value Ref Range    Lipase 22 13 - 60 U/L   Light Blue Top    Collection Time: 11/30/19  7:19 PM   Result Value Ref Range    Extra Tube hold for add-on    Green Top (Gel)    Collection Time: 11/30/19  7:19 PM   Result Value Ref Range    Extra Tube Hold for add-ons.    Lavender Top    Collection Time: 11/30/19  7:19 PM   Result Value Ref Range    Extra Tube hold for add-on    Gold Top - SST    Collection Time: 11/30/19  7:19 PM   Result Value Ref Range    Extra Tube Hold for add-ons.    CBC Auto Differential    Collection Time: 11/30/19  7:19 PM   Result Value Ref Range    WBC 5.38 3.40 - 10.80 10*3/mm3    RBC 5.50 4.14 - 5.80 10*6/mm3    Hemoglobin 15.0 13.0 - 17.7 g/dL    Hematocrit 46.3 37.5 - 51.0 %    MCV 84.2 79.0 - 97.0 fL    MCH 27.3 26.6 - 33.0 pg    MCHC 32.4 31.5 - 35.7 g/dL    RDW 12.8 12.3 - 15.4 %    RDW-SD 39.7 37.0 - 54.0 fl    MPV 8.9 6.0 - 12.0 fL    Platelets 318 140 - 450 10*3/mm3    Neutrophil % 37.0 (L) 42.7 - 76.0 %    Lymphocyte % 53.3 (H) 19.6 - 45.3 %    Monocyte % 7.1 5.0 - 12.0 %    Eosinophil % 1.7 0.3 - 6.2 %    Basophil % 0.9 0.0 - 1.5 %    Immature Grans % 0.0 0.0 - 0.5 %    Neutrophils, Absolute 1.99 1.70 - 7.00 10*3/mm3    Lymphocytes, Absolute 2.87 0.70 - 3.10 10*3/mm3    Monocytes, Absolute 0.38 0.10 - 0.90 10*3/mm3    Eosinophils, Absolute 0.09 0.00 - 0.40 10*3/mm3    Basophils, Absolute 0.05 0.00 - 0.20 10*3/mm3    Immature Grans, Absolute 0.00 0.00 - 0.05 10*3/mm3    nRBC 0.0 0.0 - 0.2  "/100 WBC   Beta Hydroxybutyrate Quantitative    Collection Time: 11/30/19  7:19 PM   Result Value Ref Range    Beta-Hydroxybutyrate Quant 0.149 0.020 - 0.270 mmol/L     Note: In addition to lab results from this visit, the labs listed above may include labs taken at another facility or during a different encounter within the last 24 hours. Please correlate lab times with ED admission and discharge times for further clarification of the services performed during this visit.    CT Abdomen Pelvis With Contrast   Final Result      1. Stool burden most characteristic of constipation.   2. Mildly prominent fluid-filled small bowel loops without convincing evidence of inflammatory change to suggest enteritis. This may reflect a mild small bowel ileus.   3. The appendix is normal.               Signer Name: Ken Smith MD    Signed: 11/30/2019 8:24 PM    Workstation Name: [email or web address]     [facility]        Vitals:    11/30/19 1902 11/30/19 2030 11/30/19 2230 11/30/19 2325   BP: 118/69 122/64 118/68 120/60   BP Location:  Right arm Right arm    Patient Position:  Sitting Sitting    Pulse: 70 75 78 62   Resp: 16 16 16 16   Temp: 98.6 °F (37 °C)      SpO2: 98% 98% 98% 99%   Weight: 59.4 kg (131 lb)      Height: 175.3 cm (69\")        Medications   sodium chloride 0.9 % bolus 1,000 mL (0 mL Intravenous Stopped 11/30/19 2045)   Morphine sulfate (PF) injection 4 mg (4 mg Intravenous Given 11/30/19 2014)   ondansetron (ZOFRAN) injection 4 mg (4 mg Intravenous Given 11/30/19 2013)   iopamidol (ISOVUE-300) 61 % injection 100 mL (85 mL Intravenous Given 11/30/19 1950)   magnesium citrate solution 296 mL (296 mL Oral Given 11/30/19 2215)     ECG/EMG Results (last 24 hours)     ** No results found for the last 24 hours. **        No orders to display                     MDM    Final diagnoses:   Generalized abdominal pain   Constipation, unspecified constipation type   Urinary tract infection in male "       Documentation assistance provided by angelo Dominguez.  Information recorded by the angelo was done at my direction and has been verified and validated by me.     Rohini Dominguez  11/30/19 1943       Rohini Dominguez  11/30/19 2030       Rohini Dominguez  11/30/19 2031       Karely Kaur, APRN  12/01/19 0149

## 2019-12-03 LAB
C TRACH RRNA SPEC DONR QL NAA+PROBE: NEGATIVE
N GONORRHOEA DNA SPEC QL NAA+PROBE: NEGATIVE

## 2019-12-04 LAB — BACTERIA SPEC AEROBE CULT: NORMAL

## 2022-03-06 ENCOUNTER — APPOINTMENT (OUTPATIENT)
Dept: CT IMAGING | Facility: HOSPITAL | Age: 37
End: 2022-03-06

## 2022-03-06 ENCOUNTER — HOSPITAL ENCOUNTER (EMERGENCY)
Facility: HOSPITAL | Age: 37
Discharge: HOME | End: 2022-03-06
Attending: EMERGENCY MEDICINE | Admitting: EMERGENCY MEDICINE

## 2022-03-06 VITALS
WEIGHT: 139 LBS | HEIGHT: 69 IN | HEART RATE: 67 BPM | DIASTOLIC BLOOD PRESSURE: 65 MMHG | RESPIRATION RATE: 16 BRPM | OXYGEN SATURATION: 98 % | BODY MASS INDEX: 20.59 KG/M2 | TEMPERATURE: 98.2 F | SYSTOLIC BLOOD PRESSURE: 113 MMHG

## 2022-03-06 DIAGNOSIS — E11.65 TYPE 2 DIABETES MELLITUS WITH HYPERGLYCEMIA, WITHOUT LONG-TERM CURRENT USE OF INSULIN: ICD-10-CM

## 2022-03-06 DIAGNOSIS — R11.2 NON-INTRACTABLE VOMITING WITH NAUSEA, UNSPECIFIED VOMITING TYPE: Primary | ICD-10-CM

## 2022-03-06 LAB
ALBUMIN SERPL-MCNC: 5.2 G/DL (ref 3.5–5.2)
ALBUMIN/GLOB SERPL: 1.6 G/DL
ALP SERPL-CCNC: 79 U/L (ref 39–117)
ALT SERPL W P-5'-P-CCNC: 17 U/L (ref 1–41)
ANION GAP SERPL CALCULATED.3IONS-SCNC: 19 MMOL/L (ref 5–15)
AST SERPL-CCNC: 12 U/L (ref 1–40)
ATMOSPHERIC PRESS: ABNORMAL MM[HG]
B-OH-BUTYR SERPL-SCNC: 1.61 MMOL/L (ref 0.02–0.27)
BACTERIA UR QL AUTO: ABNORMAL /HPF
BASE EXCESS BLDV CALC-SCNC: 8.6 MMOL/L (ref -2–2)
BASOPHILS # BLD AUTO: 0.03 10*3/MM3 (ref 0–0.2)
BASOPHILS NFR BLD AUTO: 0.4 % (ref 0–1.5)
BDY SITE: ABNORMAL
BILIRUB SERPL-MCNC: 0.6 MG/DL (ref 0–1.2)
BILIRUB UR QL STRIP: NEGATIVE
BODY TEMPERATURE: 37 C
BUN SERPL-MCNC: 15 MG/DL (ref 6–20)
BUN/CREAT SERPL: 13.9 (ref 7–25)
CALCIUM SPEC-SCNC: 10.5 MG/DL (ref 8.6–10.5)
CHLORIDE SERPL-SCNC: 94 MMOL/L (ref 98–107)
CLARITY UR: CLEAR
CO2 BLDA-SCNC: 36.9 MMOL/L (ref 22–33)
CO2 SERPL-SCNC: 27 MMOL/L (ref 22–29)
COHGB MFR BLD: 1.4 %
COLOR UR: YELLOW
CREAT SERPL-MCNC: 1.08 MG/DL (ref 0.76–1.27)
DEPRECATED RDW RBC AUTO: 40 FL (ref 37–54)
EGFRCR SERPLBLD CKD-EPI 2021: 90.1 ML/MIN/1.73
EOSINOPHIL # BLD AUTO: 0 10*3/MM3 (ref 0–0.4)
EOSINOPHIL NFR BLD AUTO: 0 % (ref 0.3–6.2)
EPAP: 0
ERYTHROCYTE [DISTWIDTH] IN BLOOD BY AUTOMATED COUNT: 13.7 % (ref 12.3–15.4)
GLOBULIN UR ELPH-MCNC: 3.2 GM/DL
GLUCOSE BLDC GLUCOMTR-MCNC: 354 MG/DL (ref 70–130)
GLUCOSE SERPL-MCNC: 381 MG/DL (ref 65–99)
GLUCOSE UR STRIP-MCNC: ABNORMAL MG/DL
HCO3 BLDV-SCNC: 35.3 MMOL/L (ref 22–28)
HCT VFR BLD AUTO: 49.9 % (ref 37.5–51)
HGB BLD-MCNC: 17 G/DL (ref 13–17.7)
HGB BLDA-MCNC: 15.1 G/DL (ref 13.5–17.5)
HGB UR QL STRIP.AUTO: ABNORMAL
HOLD SPECIMEN: NORMAL
HOLD SPECIMEN: NORMAL
HYALINE CASTS UR QL AUTO: ABNORMAL /LPF
IMM GRANULOCYTES # BLD AUTO: 0.02 10*3/MM3 (ref 0–0.05)
IMM GRANULOCYTES NFR BLD AUTO: 0.2 % (ref 0–0.5)
INHALED O2 CONCENTRATION: 21 %
IPAP: 0
KETONES UR QL STRIP: ABNORMAL
LEUKOCYTE ESTERASE UR QL STRIP.AUTO: ABNORMAL
LIPASE SERPL-CCNC: 25 U/L (ref 13–60)
LYMPHOCYTES # BLD AUTO: 2.44 10*3/MM3 (ref 0.7–3.1)
LYMPHOCYTES NFR BLD AUTO: 28.7 % (ref 19.6–45.3)
MCH RBC QN AUTO: 27.6 PG (ref 26.6–33)
MCHC RBC AUTO-ENTMCNC: 34.1 G/DL (ref 31.5–35.7)
MCV RBC AUTO: 81.1 FL (ref 79–97)
METHGB BLD QL: 0.5 %
MODALITY: ABNORMAL
MONOCYTES # BLD AUTO: 0.44 10*3/MM3 (ref 0.1–0.9)
MONOCYTES NFR BLD AUTO: 5.2 % (ref 5–12)
NEUTROPHILS NFR BLD AUTO: 5.57 10*3/MM3 (ref 1.7–7)
NEUTROPHILS NFR BLD AUTO: 65.5 % (ref 42.7–76)
NITRITE UR QL STRIP: NEGATIVE
NOTE: ABNORMAL
NRBC BLD AUTO-RTO: 0 /100 WBC (ref 0–0.2)
OXYHGB MFR BLDV: 24.5 %
PAW @ PEAK INSP FLOW SETTING VENT: 0 CMH2O
PCO2 BLDV: 54.8 MM HG (ref 41–51)
PH BLDV: 7.42 PH UNITS (ref 7.31–7.41)
PH UR STRIP.AUTO: 7 [PH] (ref 5–8)
PLATELET # BLD AUTO: 352 10*3/MM3 (ref 140–450)
PMV BLD AUTO: 9.5 FL (ref 6–12)
PO2 BLDV: 17.5 MM HG (ref 27–53)
POTASSIUM SERPL-SCNC: 4.1 MMOL/L (ref 3.5–5.2)
PROT SERPL-MCNC: 8.4 G/DL (ref 6–8.5)
PROT UR QL STRIP: ABNORMAL
RBC # BLD AUTO: 6.15 10*6/MM3 (ref 4.14–5.8)
RBC # UR STRIP: ABNORMAL /HPF
REF LAB TEST METHOD: ABNORMAL
SODIUM SERPL-SCNC: 140 MMOL/L (ref 136–145)
SP GR UR STRIP: 1.05 (ref 1–1.03)
SQUAMOUS #/AREA URNS HPF: ABNORMAL /HPF
TOTAL RATE: 0 BREATHS/MINUTE
UROBILINOGEN UR QL STRIP: ABNORMAL
WBC # UR STRIP: ABNORMAL /HPF
WBC NRBC COR # BLD: 8.5 10*3/MM3 (ref 3.4–10.8)
WHOLE BLOOD HOLD SPECIMEN: NORMAL
WHOLE BLOOD HOLD SPECIMEN: NORMAL

## 2022-03-06 PROCEDURE — 82010 KETONE BODYS QUAN: CPT | Performed by: EMERGENCY MEDICINE

## 2022-03-06 PROCEDURE — 87591 N.GONORRHOEAE DNA AMP PROB: CPT | Performed by: EMERGENCY MEDICINE

## 2022-03-06 PROCEDURE — 87491 CHLMYD TRACH DNA AMP PROBE: CPT | Performed by: EMERGENCY MEDICINE

## 2022-03-06 PROCEDURE — 25010000002 IOPAMIDOL 61 % SOLUTION: Performed by: EMERGENCY MEDICINE

## 2022-03-06 PROCEDURE — 82805 BLOOD GASES W/O2 SATURATION: CPT

## 2022-03-06 PROCEDURE — 82962 GLUCOSE BLOOD TEST: CPT

## 2022-03-06 PROCEDURE — 74177 CT ABD & PELVIS W/CONTRAST: CPT

## 2022-03-06 PROCEDURE — 81001 URINALYSIS AUTO W/SCOPE: CPT | Performed by: EMERGENCY MEDICINE

## 2022-03-06 PROCEDURE — 85025 COMPLETE CBC W/AUTO DIFF WBC: CPT | Performed by: EMERGENCY MEDICINE

## 2022-03-06 PROCEDURE — 96374 THER/PROPH/DIAG INJ IV PUSH: CPT

## 2022-03-06 PROCEDURE — 83690 ASSAY OF LIPASE: CPT | Performed by: EMERGENCY MEDICINE

## 2022-03-06 PROCEDURE — 99283 EMERGENCY DEPT VISIT LOW MDM: CPT

## 2022-03-06 PROCEDURE — 25010000002 DROPERIDOL PER 5 MG: Performed by: EMERGENCY MEDICINE

## 2022-03-06 PROCEDURE — 80053 COMPREHEN METABOLIC PANEL: CPT | Performed by: EMERGENCY MEDICINE

## 2022-03-06 RX ORDER — DROPERIDOL 2.5 MG/ML
2.5 INJECTION, SOLUTION INTRAMUSCULAR; INTRAVENOUS ONCE
Status: COMPLETED | OUTPATIENT
Start: 2022-03-06 | End: 2022-03-06

## 2022-03-06 RX ORDER — ONDANSETRON 8 MG/1
8 TABLET, ORALLY DISINTEGRATING ORAL EVERY 8 HOURS PRN
Qty: 15 TABLET | Refills: 0 | Status: SHIPPED | OUTPATIENT
Start: 2022-03-06

## 2022-03-06 RX ORDER — SODIUM CHLORIDE 9 MG/ML
10 INJECTION INTRAVENOUS AS NEEDED
Status: DISCONTINUED | OUTPATIENT
Start: 2022-03-06 | End: 2022-03-07 | Stop reason: HOSPADM

## 2022-03-06 RX ADMIN — SODIUM CHLORIDE, POTASSIUM CHLORIDE, SODIUM LACTATE AND CALCIUM CHLORIDE 1000 ML: 600; 310; 30; 20 INJECTION, SOLUTION INTRAVENOUS at 19:59

## 2022-03-06 RX ADMIN — IOPAMIDOL 75 ML: 612 INJECTION, SOLUTION INTRAVENOUS at 20:31

## 2022-03-06 RX ADMIN — DROPERIDOL 2.5 MG: 2.5 INJECTION, SOLUTION INTRAMUSCULAR; INTRAVENOUS at 19:59

## 2022-03-06 RX ADMIN — SODIUM CHLORIDE, POTASSIUM CHLORIDE, SODIUM LACTATE AND CALCIUM CHLORIDE 1000 ML: 600; 310; 30; 20 INJECTION, SOLUTION INTRAVENOUS at 21:17

## 2022-03-07 LAB — HOLD SPECIMEN: NORMAL

## 2022-03-07 NOTE — ED PROVIDER NOTES
Subjective   Patient is a 36 y.o. male presenting to the ED complaining of vomiting. The patient states that he has been experiencing vomiting for the past two days, along with mild abdominal pain and some diarrhea. The patient denies hematemesis or any other pain. He states that he ate fast food recently and felt sick after that. The patient has a history of diabetes mellitus. There are no other acute symptoms at this time.      History provided by:  Patient  Vomiting  The primary symptoms include abdominal pain, vomiting and diarrhea. Primary symptoms do not include hematemesis. The illness began 2 days ago. The onset was gradual. The problem has not changed since onset.      Review of Systems   Cardiovascular: Negative for chest pain.   Gastrointestinal: Positive for abdominal pain, diarrhea and vomiting. Negative for blood in stool and hematemesis.        Denies hematemesis.   All other systems reviewed and are negative.      Past Medical History:   Diagnosis Date   • Diabetes mellitus (HCC)        No Known Allergies    History reviewed. No pertinent surgical history.    Family History   Problem Relation Age of Onset   • Diabetes Mother    • Hypertension Father    • Diabetes Other        Social History     Socioeconomic History   • Marital status: Single   Tobacco Use   • Smoking status: Current Every Day Smoker     Packs/day: 0.50     Years: 6.00     Pack years: 3.00     Types: Cigarettes   • Smokeless tobacco: Never Used   Substance and Sexual Activity   • Alcohol use: No   • Drug use: No   • Sexual activity: Defer         Objective   Physical Exam  Vitals and nursing note reviewed.   Constitutional:       General: He is not in acute distress.     Appearance: Normal appearance.   HENT:      Head: Normocephalic and atraumatic.      Mouth/Throat:      Comments: Mucosal membranes slightly dry.  Eyes:      General: No scleral icterus.  Cardiovascular:      Rate and Rhythm: Normal rate and regular rhythm.       Heart sounds: Normal heart sounds.   Pulmonary:      Effort: Pulmonary effort is normal.      Breath sounds: Normal breath sounds.   Abdominal:      General: There is no distension.      Palpations: Abdomen is soft.      Tenderness: There is abdominal tenderness.      Comments: Tenderness to palpation in the right upper and right lower quadrant of the abdomen.   Musculoskeletal:         General: Normal range of motion.      Cervical back: Normal range of motion and neck supple.   Skin:     General: Skin is warm and dry.   Neurological:      Mental Status: He is alert and oriented to person, place, and time.   Psychiatric:         Mood and Affect: Mood normal.         Behavior: Behavior normal.         Procedures         ED Course  ED Course as of 03/06/22 2345   Sun Mar 06, 2022   2000 Glucose(!): 354 [RS]   2007 WBC: 8.50 [RS]   2116 Patient resting comfortably and reports feeling much better. [RS]   2204 Patient is resting comfortably and in no distress.  Abdomen is now benign.  Patient reports feeling much better and states he is ready to go home.  No emergent findings on her evaluation here in the ER.  I did stress with the patient the importance of closely monitoring his sugar and adhering to a diabetic diet.  He voiced understanding and was agreeable. I had a discussion with the patient/family regarding diagnosis, diagnostic results, treatment plan, and medications.  The patient/family indicated understanding of these instructions.  I spent adequate time at the bedside proceeding discharge necessary to personally discuss the aftercare instructions, giving patient education, providing explanations of the results of our evaluations/findings, and my decision making to assure that the patient/family understand the plan of care.  Time was allotted to answer questions at that time and throughout the ED course.  Emphasis was placed on timely follow-up after discharge.  I also discussed the potential for the  development of an acute emergent condition requiring further evaluation, admission, or even surgical intervention. I discussed that we found nothing during the visit today indicating the need for further workup, admission, or the presence of an unstable medical condition.  I encouraged the patient to return to the emergency department immediately for ANY concerns, worsening, new complaints, or if symptoms persist and unable to seek follow-up in a timely fashion.  The patient/family expressed understanding and agreement with this plan.  [RS]      ED Course User Index  [RS] Syed Chamorro MD          Recent Results (from the past 24 hour(s))   Comprehensive Metabolic Panel    Collection Time: 03/06/22  7:48 PM    Specimen: Blood   Result Value Ref Range    Glucose 381 (H) 65 - 99 mg/dL    BUN 15 6 - 20 mg/dL    Creatinine 1.08 0.76 - 1.27 mg/dL    Sodium 140 136 - 145 mmol/L    Potassium 4.1 3.5 - 5.2 mmol/L    Chloride 94 (L) 98 - 107 mmol/L    CO2 27.0 22.0 - 29.0 mmol/L    Calcium 10.5 8.6 - 10.5 mg/dL    Total Protein 8.4 6.0 - 8.5 g/dL    Albumin 5.20 3.50 - 5.20 g/dL    ALT (SGPT) 17 1 - 41 U/L    AST (SGOT) 12 1 - 40 U/L    Alkaline Phosphatase 79 39 - 117 U/L    Total Bilirubin 0.6 0.0 - 1.2 mg/dL    Globulin 3.2 gm/dL    A/G Ratio 1.6 g/dL    BUN/Creatinine Ratio 13.9 7.0 - 25.0    Anion Gap 19.0 (H) 5.0 - 15.0 mmol/L    eGFR 90.1 >60.0 mL/min/1.73   Lipase    Collection Time: 03/06/22  7:48 PM    Specimen: Blood   Result Value Ref Range    Lipase 25 13 - 60 U/L   Green Top (Gel)    Collection Time: 03/06/22  7:48 PM   Result Value Ref Range    Extra Tube Hold for add-ons.    Lavender Top    Collection Time: 03/06/22  7:48 PM   Result Value Ref Range    Extra Tube hold for add-on    Gold Top - SST    Collection Time: 03/06/22  7:48 PM   Result Value Ref Range    Extra Tube Hold for add-ons.    Light Blue Top    Collection Time: 03/06/22  7:48 PM   Result Value Ref Range    Extra Tube hold for add-on     CBC Auto Differential    Collection Time: 03/06/22  7:48 PM    Specimen: Blood   Result Value Ref Range    WBC 8.50 3.40 - 10.80 10*3/mm3    RBC 6.15 (H) 4.14 - 5.80 10*6/mm3    Hemoglobin 17.0 13.0 - 17.7 g/dL    Hematocrit 49.9 37.5 - 51.0 %    MCV 81.1 79.0 - 97.0 fL    MCH 27.6 26.6 - 33.0 pg    MCHC 34.1 31.5 - 35.7 g/dL    RDW 13.7 12.3 - 15.4 %    RDW-SD 40.0 37.0 - 54.0 fl    MPV 9.5 6.0 - 12.0 fL    Platelets 352 140 - 450 10*3/mm3    Neutrophil % 65.5 42.7 - 76.0 %    Lymphocyte % 28.7 19.6 - 45.3 %    Monocyte % 5.2 5.0 - 12.0 %    Eosinophil % 0.0 (L) 0.3 - 6.2 %    Basophil % 0.4 0.0 - 1.5 %    Immature Grans % 0.2 0.0 - 0.5 %    Neutrophils, Absolute 5.57 1.70 - 7.00 10*3/mm3    Lymphocytes, Absolute 2.44 0.70 - 3.10 10*3/mm3    Monocytes, Absolute 0.44 0.10 - 0.90 10*3/mm3    Eosinophils, Absolute 0.00 0.00 - 0.40 10*3/mm3    Basophils, Absolute 0.03 0.00 - 0.20 10*3/mm3    Immature Grans, Absolute 0.02 0.00 - 0.05 10*3/mm3    nRBC 0.0 0.0 - 0.2 /100 WBC   Beta Hydroxybutyrate Quantitative    Collection Time: 03/06/22  7:48 PM    Specimen: Blood   Result Value Ref Range    Beta-Hydroxybutyrate Quant 1.609 (H) 0.020 - 0.270 mmol/L   POC Glucose Once    Collection Time: 03/06/22  7:49 PM    Specimen: Blood   Result Value Ref Range    Glucose 354 (H) 70 - 130 mg/dL   Urinalysis With Microscopic If Indicated (No Culture) - Urine, Clean Catch    Collection Time: 03/06/22  8:00 PM    Specimen: Urine, Clean Catch   Result Value Ref Range    Color, UA Yellow Yellow, Straw    Appearance, UA Clear Clear    pH, UA 7.0 5.0 - 8.0    Specific Gravity, UA 1.048 (H) 1.001 - 1.030    Glucose, UA >=1000 mg/dL (3+) (A) Negative    Ketones, UA >=160 mg/dL (4+) (A) Negative    Bilirubin, UA Negative Negative    Blood, UA Trace (A) Negative    Protein, UA >=300 mg/dL (3+) (A) Negative    Leuk Esterase, UA Trace (A) Negative    Nitrite, UA Negative Negative    Urobilinogen, UA 1.0 E.U./dL 0.2 - 1.0 E.U./dL   Urinalysis,  "Microscopic Only - Urine, Clean Catch    Collection Time: 03/06/22  8:00 PM    Specimen: Urine, Clean Catch   Result Value Ref Range    RBC, UA 3-6 (A) None Seen, 0-2 /HPF    WBC, UA Too Numerous to Count (A) None Seen, 0-2 /HPF    Bacteria, UA None Seen None Seen, Trace /HPF    Squamous Epithelial Cells, UA 7-12 (A) None Seen, 0-2 /HPF    Hyaline Casts, UA 31-50 0 - 6 /LPF    Methodology Manual Light Microscopy    Blood Gas, Venous With Co-Ox    Collection Time: 03/06/22  9:58 PM    Specimen: Venous Blood   Result Value Ref Range    Site Nurse/Dr Draw     pH, Venous 7.417 (H) 7.310 - 7.410 pH Units    pCO2, Venous 54.8 (H) 41.0 - 51.0 mm Hg    pO2, Venous 17.5 (L) 27.0 - 53.0 mm Hg    HCO3, Venous 35.3 (H) 22.0 - 28.0 mmol/L    Base Excess, Venous 8.6 (H) -2.0 - 2.0 mmol/L    Hemoglobin, Blood Gas 15.1 13.5 - 17.5 g/dL    Oxyhemoglobin Venous 24.5 %    Methemoglobin Venous 0.5 %    Carboxyhemoglobin Venous 1.4 %    CO2 Content 36.9 (H) 22 - 33 mmol/L    Temperature 37.0 C    Barometric Pressure for Blood Gas      Modality Room Air     FIO2 21 %    Rate 0 Breaths/minute    PIP 0 cmH2O    IPAP 0     EPAP 0     Note       Note: In addition to lab results from this visit, the labs listed above may include labs taken at another facility or during a different encounter within the last 24 hours. Please correlate lab times with ED admission and discharge times for further clarification of the services performed during this visit.    CT Abdomen Pelvis With Contrast   Final Result   No evidence of acute disease in the abdomen or pelvis.      Electronically signed by:  Miguel Gibson M.D.     3/6/2022 7:05 PM Mountain Time        Vitals:    03/06/22 1938 03/06/22 2059 03/06/22 2130   BP: 138/95 119/61 113/65   Pulse: 67     Resp: 16     Temp: 98.2 °F (36.8 °C)     TempSrc: Oral     SpO2: 98% 100% 98%   Weight: 63 kg (139 lb)     Height: 175.3 cm (69\")       Medications   Sodium Chloride (PF) 0.9 % 10 mL (has no administration " in time range)   lactated ringers bolus 1,000 mL (0 mL Intravenous Stopped 3/6/22 2130)   droperidol (INAPSINE) injection 2.5 mg (2.5 mg Intravenous Given 3/6/22 1959)   iopamidol (ISOVUE-300) 61 % injection 100 mL (75 mL Intravenous Given 3/6/22 2031)   lactated ringers bolus 1,000 mL (0 mL Intravenous Stopped 3/6/22 2223)     ECG/EMG Results (last 24 hours)     ** No results found for the last 24 hours. **        No orders to display                                         MDM  Number of Diagnoses or Management Options     Amount and/or Complexity of Data Reviewed  Clinical lab tests: reviewed  Tests in the radiology section of CPT®: reviewed  Independent visualization of images, tracings, or specimens: yes        Final diagnoses:   Non-intractable vomiting with nausea, unspecified vomiting type   Type 2 diabetes mellitus with hyperglycemia, without long-term current use of insulin (HCC)     DISCHARGE    Patient discharged in stable condition.    Reviewed implications of results, diagnosis, meds, responsibility to follow up, warning signs and symptoms of possible worsening, potential complications and reasons to return to ER.    Patient/Family voiced understanding of above instructions.    Discussed plan for discharge, as there is no emergent indication for admission.  Pt/family is agreeable and understands need for follow up and possible repeat testing.  Pt/family is aware that discharge does not mean that nothing is wrong but that it indicates no emergency is currently present that requires admission and they must continue care with follow-up as given below or with a physician of their choice.     FOLLOW-UP  [facility] Emergency Department  [address] [facility] [address] [phone]    As needed, If symptoms worsen or ANY concerns.    Shiloh Miles, APRN  [address] [facility]  [address] [place] [address]  [phone]    Schedule an appointment as soon as possible for a  visit   As soon as possible.         Medication List      New Prescriptions    ondansetron ODT 8 MG disintegrating tablet  Commonly known as: ZOFRAN-ODT  Place 1 tablet on the tongue Every 8 (Eight) Hours As Needed for Nausea or Vomiting.        Stop    BASAGLAR KWIKPEN 100 UNIT/ML injection pen     cefdinir 300 MG capsule  Commonly known as: OMNICEF     insulin lispro 100 UNIT/ML injection  Commonly known as: humaLOG     Insulin Pen Needle 32G X 5 MM misc     metFORMIN 500 MG tablet  Commonly known as: GLUCOPHAGE     polyethylene glycol packet  Commonly known as: MIRALAX           Where to Get Your Medications      These medications were sent to [id number] - [place], [place] - [address] AT [facility] [address] - [phone]  - [phone] [facility] [address], [facility] [address]    Phone: [phone]   · ondansetron ODT 8 MG disintegrating tablet       Documentation assistance provided by angelo Betancourt.  Information recorded by the angelo was done at my direction and has been verified and validated by me.     Ken Betancourt  03/06/22 1950       Ken Betancourt  03/06/22 2012       Syed Chamorro MD  03/06/22 8048

## 2022-03-07 NOTE — EXTERNAL PATIENT INSTRUCTIONS
Patient Education   Table of Contents       Blood Glucose Monitoring, Adult       Diabetes Mellitus and Nutrition, Adult       Hyperglycemia       Nausea and Vomiting, Adult       How to Use a Glucose Meter     To view videos and all your education online visit,   [email or web address]   or scan this QR code with your smartphone.                  Blood Glucose Monitoring, Adult     Monitoring your blood sugar (glucose) is an important part of managing your diabetes. Blood glucose monitoring involves checking your blood glucose as often as directed and keeping a log or record of your results over time.    Checking your blood glucose regularly and keeping a blood glucose log can:       Help you and your health care provider adjust your diabetes management plan as needed, including your medicines or insulin.       Help you understand how food, exercise, illnesses, and medicines affect your blood glucose.       Let you know what your blood glucose is at any time. You can quickly find out if you have low blood glucose (hypoglycemia) or high blood glucose (hyperglycemia).      Your health care provider will set individualized treatment goals for you. Your goals will be based on your age, other medical conditions you have, and how you respond to diabetes treatment. Generally, the goal of treatment is to maintain the following blood glucose levels:       Before meals (preprandial): 80?130 mg/dL (4.4?7.2 mmol/L).       After meals (postprandial): below 180 mg/dL (10 mmol/L).       A1C level: less than 7%.     Supplies needed:         Blood glucose meter.       Test strips for your meter. Each meter has its own strips. You must use the strips that came with your meter.       A needle to prick your finger (lancet). Do not  use a lancet more than one time.       A device that holds the lancet (lancing device).       A journal or log book to write down your results.     How to check your blood glucose   Checking your  blood glucose          Wash your hands for at least 20 seconds with soap and water.       Prick the side of your finger (not the tip) with the lancet. Do not  use the same finger consecutively.       Gently rub the finger until a small drop of blood appears.       Follow instructions that come with your meter for inserting the test strip, applying blood to the strip, and using your blood glucose meter.       Write down your result and any notes in your log.   Using alternative sites   Some meters allow you to use areas of your body other than your finger (alternative sites) to test your blood. The most common alternative sites are the forearm, the thigh, and the palm of your hand.   Alternative sites may not be as accurate as the fingers because blood flow is slower in those areas. This means that the result you get may be delayed, and it may be different from the result that you would get from your finger.    Use the finger only, and do not  use alternative sites, if:       You think you have hypoglycemia.       You sometimes do not know that your blood glucose is getting low (hypoglycemia unawareness).       General tips and recommendations   Blood glucose log           Every time you check your blood glucose, write down your result. Also write down any notes about things that may be affecting your blood glucose, such as your diet and exercise for the day. This information can help you and your health care provider:       Look for patterns in your blood glucose over time.       Adjust your diabetes management plan as needed.       Check if your meter allows you to download your records to a computer or if there is an yessica for the meter. Most glucose meters store a record of glucose readings in the meter.     If you have type 1 diabetes:        Check your blood glucose 4 or more times a day if you are on intensive insulin therapy with multiple daily injections (MDI) or if you are using an insulin pump. Check your  blood glucose:       Before every meal and snack.       Before bedtime.      Also check your blood glucose:       If you have symptoms of hypoglycemia.       After treating low blood glucose.       Before doing activities that create a risk for injury, like driving or using machinery.       Before and after exercise.       Two hours after a meal.       Occasionally between 2:00 a.m. and 3:00 a.m., as directed.      You may need to check your blood glucose more often, 6?10 times per day, if:       You have diabetes that is not well controlled.       You are ill.       You have a history of severe hypoglycemia.       You have hypoglycemia unawareness.     If you have type 2 diabetes:         Check your blood glucose 2 or more times a day if you take insulin or other diabetes medicines.       Check your blood glucose 4 or more times a day if you are on intensive insulin therapy. Occasionally, you may also need to check your glucose between 2:00 a.m. and 3:00 a.m., as directed.      Also check your blood glucose:       Before and after exercise.       Before doing activities that create a risk for injury, like driving or using machinery.      You may need to check your blood glucose more often if:       Your medicine is being adjusted.       Your diabetes is not well controlled.       You are ill.     General tips         Make sure you always have your supplies with you.       After you use a few boxes of test strips, adjust (calibrate) your blood glucose meter by following instructions that came with your meter.       If you have questions or need help, all blood glucose meters have a 24-hour hotline phone number available that you can call. Also contact your health care provider with questions or concerns you may have.       Where to find more information         The American Diabetes Association: www.diabetes.org         The Association of Diabetes Care & Education Specialists: www.diabeteseducator.org       Contact  a health care provider if:         Your blood glucose is at or above 240 mg/dL (13.3 mmol/L) for 2 days in a row.       You have been sick or have had a fever for 2 days or longer, and you are not getting better.      You have any of the following problems for more than 6 hours:       You cannot eat or drink.       You have nausea or vomiting.       You have diarrhea.     Get help right away if:         Your blood glucose is lower than 54 mg/dL (3 mmol/L).       You become confused, or you have trouble thinking clearly.       You have difficulty breathing.       You have moderate or large ketone levels in your urine.     These symptoms may represent a serious problem that is an emergency. Do not wait to see if the symptoms will go away. Get medical help right away. Call your local emergency services (911 in the [place] Do not drive yourself to the hospital.   Summary         Monitoring your blood glucose is an important part of managing your diabetes.       Blood glucose monitoring involves checking your blood glucose as often as directed and keeping a log or record of your results over time.       Your health care provider will set individualized treatment goals for you. Your goals will be based on your age, other medical conditions you have, and how you respond to diabetes treatment.       Every time you check your blood glucose, write down your result. Also, write down any notes about things that may be affecting your blood glucose, such as your diet and exercise for the day.     This information is not intended to replace advice given to you by your health care provider. Make sure you discuss any questions you have with your health care provider.     Document Released: 12/20/2004Document Revised: 09/15/2021Document Reviewed: 09/15/2021     Elsevier Patient Education ? 2021 [employer]         Diabetes Mellitus and Nutrition, Adult     When you have diabetes, or diabetes mellitus, it is very important to have  healthy eating habits because your blood sugar (glucose) levels are greatly affected by what you eat and drink. Eating healthy foods in the right amounts, at about the same times every day, can help you:       Control your blood glucose.       Lower your risk of heart disease.       Improve your blood pressure.       Reach or maintain a healthy weight.     What can affect my meal plan?    Every person with diabetes is different, and each person has different needs for a meal plan. Your health care provider may recommend that you work with a dietitian to make a meal plan that is best for you. Your meal plan may vary depending on factors such as:       The calories you need.       The medicines you take.       Your weight.       Your blood glucose, blood pressure, and cholesterol levels.       Your activity level.       Other health conditions you have, such as heart or kidney disease.     How do carbohydrates affect me?   Carbohydrates, also called carbs, affect your blood glucose level more than any other type of food. Eating carbs naturally raises the amount of glucose in your blood. Carb counting is a method for keeping track of how many carbs you eat. Counting carbs is important to keep your blood glucose at a healthy level, especially if you use insulin or take certain oral diabetes medicines.   It is important to know how many carbs you can safely have in each meal. This is different for every person. Your dietitian can help you calculate how many carbs you should have at each meal and for each snack.   How does alcohol affect me?    Alcohol can cause a sudden decrease in blood glucose (hypoglycemia), especially if you use insulin or take certain oral diabetes medicines. Hypoglycemia can be a life-threatening condition. Symptoms of hypoglycemia, such as sleepiness, dizziness, and confusion, are similar to symptoms of having too much alcohol.      Do not  drink alcohol if:       Your health care provider tells  "you not to drink.       You are pregnant, may be pregnant, or are planning to become pregnant.      If you drink alcohol:      Do not  drink on an empty stomach.      Limit how much you use to:       0?1 drink a day for women.       0?2 drinks a day for men.       Be aware of how much alcohol is in your drink. In the [place] one drink equals one 12 oz bottle of beer (355 mL), one 5 oz glass of wine (148 mL), or one 1? oz glass of hard liquor (44 mL).      Keep yourself hydrated with water, diet soda, or unsweetened iced tea.       Keep in mind that regular soda, juice, and other mixers may contain a lot of sugar and must be counted as carbs.         What are tips for following this plan?      Reading food labels         Start by checking the serving size on the \"Nutrition Facts\" label of packaged foods and drinks. The amount of calories, carbs, fats, and other nutrients listed on the label is based on one serving of the item. Many items contain more than one serving per package.       Check the total grams (g) of carbs in one serving. You can calculate the number of servings of carbs in one serving by dividing the total carbs by 15. For example, if a food has 30 g of total carbs per serving, it would be equal to 2 servings of carbs.       Check the number of grams (g) of saturated fats and trans fats in one serving. Choose foods that have a low amount or none of these fats.       Check the number of milligrams (mg) of salt (sodium) in one serving. Most people should limit total sodium intake to less than 2,300 mg per day.       Always check the nutrition information of foods labeled as \"low-fat\" or \"nonfat.\" These foods may be higher in added sugar or refined carbs and should be avoided.       Talk to your dietitian to identify your daily goals for nutrients listed on the label.     Shopping         Avoid buying canned, pre-made, or processed foods. These foods tend to be high in fat, sodium, and added sugar.       " Shop around the outside edge of the grocery store. This is where you will most often find fresh fruits and vegetables, bulk grains, fresh meats, and fresh dairy.     Cooking         Use low-heat cooking methods, such as baking, instead of high-heat cooking methods like deep frying.       Cook using healthy oils, such as olive, canola, or sunflower oil.       Avoid cooking with butter, cream, or high-fat meats.     Meal planning         Eat meals and snacks regularly, preferably at the same times every day. Avoid going long periods of time without eating.       Eat foods that are high in fiber, such as fresh fruits, vegetables, beans, and whole grains. Talk with your dietitian about how many servings of carbs you can eat at each meal.      Eat 4?6 oz (112?168 g) of lean protein each day, such as lean meat, chicken, fish, eggs, or tofu. One ounce (oz) of lean protein is equal to:       1 oz (28 g) of meat, chicken, or fish.       1 egg.       ? cup (62 g) of tofu.       Eat some foods each day that contain healthy fats, such as avocado, nuts, seeds, and fish.       What foods should I eat?   Fruits   Berries. Apples. Oranges. Peaches. Apricots. Plums. Grapes. [place]. Papaya. Pomegranate. Kiwi. Cherries.   Vegetables   Lettuce. Spinach. Leafy greens, including kale, chard, elise greens, and mustard greens. Beets. Cauliflower. Cabbage. Broccoli. Carrots. Green beans. Tomatoes. Peppers. Onions. Cucumbers. [place] sprouts.   Grains   Whole grains, such as whole-wheat or whole-grain bread, crackers, tortillas, cereal, and pasta. Unsweetened oatmeal. Quinoa. Brown or wild rice.   Meats and other proteins   Seafood. Poultry without skin. Lean cuts of poultry and beef. Tofu. Nuts. Seeds.   Dairy   Low-fat or fat-free dairy products such as milk, yogurt, and cheese.   The items listed above may not be a complete list of foods and beverages you can eat. Contact a dietitian for more information.   What foods should I avoid?    Fruits   Fruits canned with syrup.   Vegetables   Canned vegetables. Frozen vegetables with butter or cream sauce.   Grains   Refined white flour and flour products such as bread, pasta, snack foods, and cereals. Avoid all processed foods.   Meats and other proteins   Fatty cuts of meat. Poultry with skin. Breaded or fried meats. Processed meat. Avoid saturated fats.   Dairy   Full-fat yogurt, cheese, or milk.   Beverages   Sweetened drinks, such as soda or iced tea.   The items listed above may not be a complete list of foods and beverages you should avoid. Contact a dietitian for more information.   Questions to ask a health care provider         Do I need to meet with a diabetes educator?       Do I need to meet with a dietitian?       What number can I call if I have questions?       When are the best times to check my blood glucose?     Where to find more information:         American Diabetes Association: diabetes.org         Academy of Nutrition and Dietetics: www.eatright.org         National [place] of Diabetes and Digestive and Kidney Diseases: www.niddk.nih.gov         Association of Diabetes Care and Education Specialists: www.diabeteseducator.org       Summary         It is important to have healthy eating habits because your blood sugar (glucose) levels are greatly affected by what you eat and drink.       A healthy meal plan will help you control your blood glucose and maintain a healthy lifestyle.       Your health care provider may recommend that you work with a dietitian to make a meal plan that is best for you.       Keep in mind that carbohydrates (carbs) and alcohol have immediate effects on your blood glucose levels. It is important to count carbs and to use alcohol carefully.     This information is not intended to replace advice given to you by your health care provider. Make sure you discuss any questions you have with your health care provider.     Document Released: 09/14/2006Document  Revised: 11/24/2020Document Reviewed: 11/24/2020     [employer] Patient Education ? 2021 [employer]         Hyperglycemia     Hyperglycemia occurs when the level of sugar (glucose) in the blood is too high. Glucose is a type of sugar that provides the body's main source of energy. Certain hormones (insulin and glucagon) control the level of glucose in the blood. Insulin lowers blood glucose, and glucagon increases blood glucose. Hyperglycemia can result from not having enough insulin in the bloodstream, or from the body not responding normally to insulin.   Hyperglycemia occurs most often in people who have diabetes (diabetes mellitus), but it can happen in people who do not have diabetes. It can develop quickly, and it can be life-threatening if it causes you to become severely dehydrated (diabetic ketoacidosis or hyperglycemic hyperosmolar state). Severe hyperglycemia is a medical emergency.   For most people with diabetes, a blood glucose level above 240 mg/dL is considered hyperglycemia.   What are the causes?    If you have diabetes, hyperglycemia may be caused by:       Medicines that increase blood glucose or affect your diabetes control.       Getting less physical activity.       Eating more than planned.       Being sick or injured, having an infection, or having surgery.       Stress.       Not giving yourself enough insulin (if you are taking insulin).     If you have undiagnosed diabetes, this may be the reason you have hyperglycemia.    If you do not have diabetes, hyperglycemia may be caused by:      Certain medicines, including:       Steroid medicines.       Beta-blockers.       Epinephrine.       Thiazide diuretics.       Stress.       Having a serious illness, an infection, or surgery.       Diseases of the pancreas.     What increases the risk?    Hyperglycemia is more likely to develop in people who have risk factors for diabetes, such as:       Having a family member with diabetes.        Certain conditions in which the body's disease-fighting system (immune system) attacks itself (autoimmune disorders).       Being overweight or obese.       Having an inactive (sedentary) lifestyle.       Having been diagnosed with insulin resistance.       Having a history of prediabetes, gestational diabetes, or polycystic ovarian syndrome (PCOS).     What are the signs or symptoms?    Hyperglycemia may not cause any symptoms. If you do have symptoms, they may include:       Increased thirst.       Needing to urinate more often than usual.       Hunger.       Feeling very tired.       Blurry vision.      Other symptoms may develop if hyperglycemia gets worse, such as:       Dry mouth.       Abdominal pain.       Loss of appetite.       Fruity-smelling breath.       Weakness.       Unexpected weight loss.       Tingling or numbness in the hands or feet.       Headache.       Cuts or bruises that are slow to heal.     How is this diagnosed?   Hyperglycemia is diagnosed with a blood test to measure your blood glucose level. This blood test is usually done while you are having symptoms. Your health care provider may also do a physical exam and review your medical history.    You may have more tests to determine the cause of your hyperglycemia, such as:       A fasting blood glucose (FBG) test. You will not be allowed to eat (you will fast) for at least 8 hours before a blood sample is taken.       An A1C blood test. This provides information about blood glucose control over the previous 2?3 months.      An oral glucose tolerance test (OGTT). This measures your blood glucose at two times:       After fasting. This is your baseline blood glucose level.       2 hours after drinking a beverage that contains glucose.     How is this treated?    Treatment depends on the cause of your hyperglycemia. Treatment may include:       Taking medicine to regulate your blood glucose levels. If you take insulin or other diabetes  medicines, your medicine or dosage may be adjusted.       Lifestyle changes, such as exercising more, eating healthier foods, or losing weight.       Treating an illness or infection.       Checking your blood glucose more often.       Stopping or reducing steroid medicines.     If your hyperglycemia becomes severe and it results in diabetic ketoacidosis or hyperglycemic hyperosmolar state, you must be hospitalized and given IV fluids and IV insulin.   Follow these instructions at home:   General instructions         Take over-the-counter and prescription medicines only as told by your health care provider.      Do not  use any products that contain nicotine or tobacco. These products include cigarettes, chewing tobacco, and vaping devices, such as e-cigarettes. If you need help quitting, ask your health care provider.      If you drink alcohol:      Limit how much you have to:       0?1 drink a day for women who are not pregnant.       0?2 drinks a day for men.           Know how much alcohol is in a drink. In the [place] one drink equals one 12 oz bottle of beer (355 mL), one 5 oz glass of wine (148 mL), or one 1? oz glass of hard liquor (44 mL).         Learn to manage stress. If you need help with this, ask your health care provider.       Do exercises as told by your health care provider.       Keep all follow-up visits. This is important.   Eating and drinking            Maintain a healthy weight.       Stay hydrated, especially when you exercise, get sick, or spend time in hot temperatures.       Drink enough fluid to keep your urine pale yellow.     If you have diabetes:            Know the symptoms of hyperglycemia.      Follow your diabetes management plan as told by your health care provider. Make sure you:       Take your insulin and medicines as told.       Follow your exercise plan.       Follow your meal plan. Eat on time, and do not  skip meals.       Check your blood glucose as often as told. Make  "sure to check your blood glucose before and after exercise. If you exercise longer or in a different way, check your blood glucose more often.       Follow your sick day plan whenever you cannot eat or drink normally. Make this plan in advance with your health care provider.       Share your diabetes management plan with people in your workplace, school, and household.       Check your urine for ketones when you are ill and as told by your health care provider.       Carry a medical alert card or wear medical alert jewelry.         Where to find more information   American Diabetes Association:   www.diabetes.org     Contact a health care provider if:         Your blood glucose is at or above 240 mg/dL (13.3 mmol/L) for 2 days in a row.       You have problems keeping your blood glucose in your target range.       You have frequent episodes of hyperglycemia.       You have signs of illness, such as nausea, vomiting, or fever.     Get help right away if:         Your blood glucose monitor reads \"high\" even when you are taking insulin.       You have trouble breathing.       You have a change in how you think, feel, or act (mental status).       You have nausea or vomiting that does not go away.     These symptoms may represent a serious problem that is an emergency. Do not wait to see if the symptoms will go away. Get medical help right away. Call your local emergency services (911 in the [place] Do not drive yourself to the hospital.   Summary         Hyperglycemia occurs when the level of sugar (glucose) in the blood is too high.       Hyperglycemia can happen with or without diabetes, and severe hyperglycemia can be life-threatening.       Hyperglycemia is diagnosed with a blood test to measure your blood glucose level. This blood test is usually done while you are having symptoms. Your health care provider may also do a physical exam and review your medical history.       If you have diabetes, follow your diabetes " management plan as told by your health care provider.       Contact your health care provider if you have problems keeping your blood glucose in your target range.     This information is not intended to replace advice given to you by your health care provider. Make sure you discuss any questions you have with your health care provider.     Document Released: 06/13/2002Document Revised: 10/01/2021Document Reviewed: 10/01/2021     [employer]vier Patient Education ? 2021 [employer]         Nausea and Vomiting, Adult     Nausea is the feeling that you have an upset stomach or that you are about to vomit. Vomiting is when stomach contents are thrown up and out of the mouth as a result of nausea. Vomiting can make you feel weak and cause you to become dehydrated.   Dehydration can make you feel tired and thirsty, cause you to have a dry mouth, and decrease how often you urinate. Older adults and people with other diseases or a weak disease-fighting system (immune system) are at higher risk for dehydration. It is important to treat your nausea and vomiting as told by your health care provider.   Follow these instructions at home:   Watch your symptoms for any changes. Tell your health care provider about them. Follow these instructions to care for yourself at home.   Eating and drinking               Take an oral rehydration solution (ORS). This is a drink that is sold at pharmacies and retail stores.       Drink clear fluids slowly and in small amounts as you are able. Clear fluids include water, ice chips, low-calorie sports drinks, and fruit juice that has water added (diluted fruit juice).       Eat bland, easy-to-digest foods in small amounts as you are able. These foods include bananas, applesauce, rice, lean meats, toast, and crackers.       Avoid fluids that contain a lot of sugar or caffeine, such as energy drinks, sports drinks, and soda.       Avoid alcohol.       Avoid spicy or fatty foods.       General  instructions         Take over-the-counter and prescription medicines only as told by your health care provider.       Drink enough fluid to keep your urine pale yellow.       Wash your hands often using soap and water. If soap and water are not available, use hand .       Make sure that all people in your household wash their hands well and often.       Rest at home while you recover.       Watch your condition for any changes.       Breathe slowly and deeply when you feel nauseated.       Keep all follow-up visits as told by your health care provider. This is important.       Contact a health care provider if:         Your symptoms get worse.       You have new symptoms.       You have a fever.       You cannot drink fluids without vomiting.       Your nausea does not go away after 2 days.       You feel light-headed or dizzy.       You have a headache.       You have muscle cramps.       You have a rash.       You have pain while urinating.     Get help right away if:         You have pain in your chest, neck, arm, or jaw.       You feel extremely weak or you faint.       You have persistent vomiting.       You have vomit that is bright red or looks like black coffee grounds.       You have bloody or black stools or stools that look like tar.       You have a severe headache, a stiff neck, or both.       You have severe pain, cramping, or bloating in your abdomen.       You have difficulty breathing, or you are breathing very quickly.       Your heart is beating very quickly.       Your skin feels cold and clammy.       You feel confused.      You have signs of dehydration, such as:       Dark urine, very little urine, or no urine.       Cracked lips.       Dry mouth.       Sunken eyes.       Sleepiness.       Weakness.     These symptoms may represent a serious problem that is an emergency. Do not wait to see if the symptoms will go away. Get medical help right away. Call your local emergency services  (911 in the [place] Do not drive yourself to the hospital.   Summary         Nausea is the feeling that you have an upset stomach or that you are about to vomit. As nausea gets worse, it can lead to vomiting. Vomiting can make you feel weak and cause you to become dehydrated.       Follow instructions from your health care provider about eating and drinking to prevent dehydration.       Take over-the-counter and prescription medicines only as told by your health care provider.       Contact your health care provider if your symptoms get worse, or you have new symptoms.       Keep all follow-up visits as told by your health care provider. This is important.     This information is not intended to replace advice given to you by your health care provider. Make sure you discuss any questions you have with your health care provider.     Document Released: 12/18/2006Document Revised: 04/10/2020Document Reviewed: 05/28/2019     [employer] Patient Education ? 2021 [employer]

## 2022-03-08 LAB
C TRACH RRNA SPEC QL NAA+PROBE: NEGATIVE
N GONORRHOEA RRNA SPEC QL NAA+PROBE: NEGATIVE

## 2022-10-21 ENCOUNTER — HOSPITAL ENCOUNTER (EMERGENCY)
Facility: HOSPITAL | Age: 37
Discharge: HOME | End: 2022-10-22
Attending: EMERGENCY MEDICINE | Admitting: EMERGENCY MEDICINE

## 2022-10-21 DIAGNOSIS — Z72.0 TOBACCO ABUSE: ICD-10-CM

## 2022-10-21 DIAGNOSIS — Z86.39 HISTORY OF DIABETES MELLITUS: ICD-10-CM

## 2022-10-21 DIAGNOSIS — M27.2 ABSCESS OF JAW, RIGHT: Primary | ICD-10-CM

## 2022-10-21 PROCEDURE — 99283 EMERGENCY DEPT VISIT LOW MDM: CPT

## 2022-10-22 VITALS
HEART RATE: 79 BPM | TEMPERATURE: 98.5 F | RESPIRATION RATE: 16 BRPM | DIASTOLIC BLOOD PRESSURE: 92 MMHG | SYSTOLIC BLOOD PRESSURE: 159 MMHG | BODY MASS INDEX: 20.73 KG/M2 | WEIGHT: 140 LBS | HEIGHT: 69 IN | OXYGEN SATURATION: 97 %

## 2022-10-22 PROCEDURE — 87070 CULTURE OTHR SPECIMN AEROBIC: CPT | Performed by: PHYSICIAN ASSISTANT

## 2022-10-22 PROCEDURE — 87205 SMEAR GRAM STAIN: CPT | Performed by: PHYSICIAN ASSISTANT

## 2022-10-22 PROCEDURE — 87077 CULTURE AEROBIC IDENTIFY: CPT | Performed by: PHYSICIAN ASSISTANT

## 2022-10-22 PROCEDURE — 87186 SC STD MICRODIL/AGAR DIL: CPT | Performed by: PHYSICIAN ASSISTANT

## 2022-10-22 RX ORDER — CEPHALEXIN 500 MG/1
500 CAPSULE ORAL 2 TIMES DAILY
Qty: 14 CAPSULE | Refills: 0 | Status: SHIPPED | OUTPATIENT
Start: 2022-10-22

## 2022-10-22 RX ORDER — SULFAMETHOXAZOLE AND TRIMETHOPRIM 800; 160 MG/1; MG/1
1 TABLET ORAL 2 TIMES DAILY
Qty: 14 TABLET | Refills: 0 | Status: SHIPPED | OUTPATIENT
Start: 2022-10-22

## 2022-10-22 RX ORDER — SULFAMETHOXAZOLE AND TRIMETHOPRIM 800; 160 MG/1; MG/1
1 TABLET ORAL ONCE
Status: COMPLETED | OUTPATIENT
Start: 2022-10-22 | End: 2022-10-22

## 2022-10-22 RX ORDER — CEPHALEXIN 250 MG/1
500 CAPSULE ORAL ONCE
Status: COMPLETED | OUTPATIENT
Start: 2022-10-22 | End: 2022-10-22

## 2022-10-22 RX ADMIN — CEPHALEXIN 500 MG: 250 CAPSULE ORAL at 01:38

## 2022-10-22 RX ADMIN — SULFAMETHOXAZOLE AND TRIMETHOPRIM 1 TABLET: 800; 160 TABLET ORAL at 01:38

## 2022-10-22 NOTE — DISCHARGE INSTRUCTIONS
Patient has small superficial abscess with infectious drainage.  Wound cultures are in process.  We will cover patient with Bactrim and Keflex twice daily x7 days.  Recommend close PCP follow-up for recheck and we will give plastic surgery follow-up information due to recurrent abscess on the face.  Strongly recommend close follow-up regarding history of diabetes mellitus.  If sugars are poorly controlled, wounds will have trouble healing.  Keep wound clean with Dove soap or antibacterial Dial.  Return to the ER if worsening symptoms.

## 2022-10-22 NOTE — ED PROVIDER NOTES
Subjective   History of Present Illness  Is a 36-year-old male that presents the ER with superficial abscess to the right jaw.  Patient said he has had recurrent abscess to the same location for the last 4 weeks.  He said he was seen and evaluated at .  He denies known history of MRSA.  He does have past medical history significant for diabetes mellitus and tobacco abuse, but he is noncompliant with medical management.  Patient denies any systemic symptoms of fever, chills, body aches, or nausea/vomiting.  He said that he was able to expel a chunky, cheesy core from the small wound opening.  He denies any facial or jaw swelling.  He denies any enlarged lymph nodes in the neck or underneath his chin.  No other complaints at this time.    History provided by:  Patient  Abscess  Location:  Face  Facial abscess location: Right jaw.  Size:  0.5cm  Abscess quality: draining    Abscess quality: no fluctuance, no induration and no redness    Red streaking: no    Duration:  4 weeks  Progression:  Unchanged  Chronicity:  Recurrent  Context: diabetes    Context: not injected drug use, not insect bite/sting and not skin injury    Relieved by:  Nothing  Worsened by:  Nothing  Ineffective treatments:  None tried  Associated symptoms: no anorexia, no fatigue, no fever, no headaches, no nausea and no vomiting    Risk factors: prior abscess (Similar abscess to right jaw after shaving.)    Risk factors: no hx of MRSA        Review of Systems   Constitutional: Negative.  Negative for chills, diaphoresis, fatigue and fever.   Respiratory: Negative.    Cardiovascular: Negative.    Gastrointestinal: Negative.  Negative for anorexia, nausea and vomiting.   Genitourinary: Negative.    Musculoskeletal: Negative.    Skin: Positive for wound (abscess to right jaw; recurrent.). Negative for color change.        No soft tissue swelling to right jaw or face.   Neurological: Negative.  Negative for headaches.   All other systems reviewed and  are negative.      Past Medical History:   Diagnosis Date   • Diabetes mellitus (HCC)        No Known Allergies    History reviewed. No pertinent surgical history.    Family History   Problem Relation Age of Onset   • Diabetes Mother    • Hypertension Father    • Diabetes Other        Social History     Socioeconomic History   • Marital status: Single   Tobacco Use   • Smoking status: Every Day     Packs/day: 0.50     Years: 6.00     Pack years: 3.00     Types: Cigarettes   • Smokeless tobacco: Never   Substance and Sexual Activity   • Alcohol use: No   • Drug use: No   • Sexual activity: Defer           Objective   Physical Exam  Vitals and nursing note reviewed.   Constitutional:       General: He is not in acute distress.     Appearance: Normal appearance. He is not ill-appearing or diaphoretic.      Comments: Pt resting comfortably.  No acute sign of pain or distress. Pt does not appear ill.   HENT:      Head: Normocephalic.        Comments: 0.5 cm opening to the right jaw with purulent drainage.  No surrounding erythema or induration.  Wound opening is quite small and there is no necessity for packing.  Cultures are in process.  Neck:      Comments: No cervical lymphadenopathy.  Pulmonary:      Effort: Pulmonary effort is normal.   Musculoskeletal:      Cervical back: Neck supple.   Lymphadenopathy:      Head:      Right side of head: No submental or submandibular adenopathy.      Left side of head: No submental or submandibular adenopathy.      Cervical: No cervical adenopathy.      Comments: No cervical lymphadenopathy and no submental or submandibular lymphadenopathy.   Skin:     Findings: Wound present.      Comments: Superficial abscess to right jaw.  No induration or swelling.  See HEENT for details.   Neurological:      Mental Status: He is alert.         Procedures           ED Course  ED Course as of 10/22/22 0128   Sat Oct 22, 2022   0124 I cleaned wound thoroughly to the right jaw and expelled small  "amount of creamy, purulent drainage.  Aerobic wound cultures were obtained.  The wound cavity was open and was quite small.  There was no ability for packing.  We will cover patient with Keflex and Bactrim DS twice daily x7 days.  Recommend follow-up with plastic surgery secondary to recurrent abscess on the face.  We will give follow-up information.  Patient also needs to follow-up with PCP for management of diabetes mellitus.  Return to the ER if any worsening symptoms of fever or facial or jaw swelling. [FC]      ED Course User Index  [FC] Deepika Ochoa, OPAL                No results found for this or any previous visit (from the past 24 hour(s)).  Note: In addition to lab results from this visit, the labs listed above may include labs taken at another facility or during a different encounter within the last 24 hours. Please correlate lab times with ED admission and discharge times for further clarification of the services performed during this visit.    No orders to display     Vitals:    10/21/22 2042 10/22/22 0052   BP: 138/86 159/92   BP Location: Left arm Right arm   Patient Position: Sitting Lying   Pulse: 84 79   Resp: 16 16   Temp: 98.4 °F (36.9 °C) 98.5 °F (36.9 °C)   TempSrc: Oral Oral   SpO2: 98% 97%   Weight: 63.5 kg (140 lb)    Height: 175.3 cm (69\")      Medications   sulfamethoxazole-trimethoprim (BACTRIM DS,SEPTRA DS) 800-160 MG per tablet 1 tablet (has no administration in time range)   cephalexin (KEFLEX) capsule 500 mg (has no administration in time range)     ECG/EMG Results (last 24 hours)     ** No results found for the last 24 hours. **        No orders to display                                  MDM    Final diagnoses:   Abscess of jaw, right   History of diabetes mellitus   Tobacco abuse       ED Disposition  ED Disposition     ED Disposition   Discharge    Condition   Stable    Comment   --             Shiloh Miles, APRN  [address] " [id number]  [phone]    Schedule an appointment as soon as possible for a visit in 2 days  close f/u for re-check    PATIENT CONNECTION - [place] [id number]  [phone]  Call in 2 days  On Monday for first available recheck to [facility]  [address] [address] [phone]    If symptoms worsen         Medication List      New Prescriptions    cephalexin 500 MG capsule  Commonly known as: KEFLEX  Take 1 capsule by mouth 2 (Two) Times a Day.     sulfamethoxazole-trimethoprim 800-160 MG per tablet  Commonly known as: BACTRIM DS,SEPTRA DS  Take 1 tablet by mouth 2 (Two) Times a Day.           Where to Get Your Medications      These medications were sent to [id number] - [place], [place] - [address] [facility] [address] - [phone]  - [phone] FX  [address], [facility] [address]    Phone: [phone]   · cephalexin 500 MG capsule  · sulfamethoxazole-trimethoprim 800-160 MG per tablet          Deepika Ochoa PA-C  10/22/22 0128

## 2022-10-26 LAB
BACTERIA SPEC AEROBE CULT: ABNORMAL
BACTERIA SPEC AEROBE CULT: ABNORMAL
GRAM STN SPEC: ABNORMAL

## 2022-12-25 ENCOUNTER — APPOINTMENT (OUTPATIENT)
Dept: GENERAL RADIOLOGY | Facility: HOSPITAL | Age: 37
End: 2022-12-25
Payer: COMMERCIAL

## 2022-12-25 ENCOUNTER — HOSPITAL ENCOUNTER (EMERGENCY)
Facility: HOSPITAL | Age: 37
Discharge: HOME | End: 2022-12-25
Attending: EMERGENCY MEDICINE | Admitting: EMERGENCY MEDICINE
Payer: COMMERCIAL

## 2022-12-25 VITALS
DIASTOLIC BLOOD PRESSURE: 80 MMHG | TEMPERATURE: 98.8 F | HEART RATE: 95 BPM | OXYGEN SATURATION: 96 % | RESPIRATION RATE: 18 BRPM | HEIGHT: 69 IN | BODY MASS INDEX: 26.96 KG/M2 | SYSTOLIC BLOOD PRESSURE: 117 MMHG | WEIGHT: 182 LBS

## 2022-12-25 DIAGNOSIS — E86.0 DEHYDRATION: ICD-10-CM

## 2022-12-25 DIAGNOSIS — E11.65 POORLY CONTROLLED DIABETES MELLITUS: ICD-10-CM

## 2022-12-25 DIAGNOSIS — U07.1 COVID-19 VIRUS INFECTION: Primary | ICD-10-CM

## 2022-12-25 LAB
ALBUMIN SERPL-MCNC: 4.3 G/DL (ref 3.5–5.2)
ALBUMIN/GLOB SERPL: 0.8 G/DL
ALP SERPL-CCNC: 104 U/L (ref 39–117)
ALT SERPL W P-5'-P-CCNC: 8 U/L (ref 1–41)
ANION GAP SERPL CALCULATED.3IONS-SCNC: 14 MMOL/L (ref 5–15)
AST SERPL-CCNC: 10 U/L (ref 1–40)
ATMOSPHERIC PRESS: ABNORMAL MM[HG]
B PARAPERT DNA SPEC QL NAA+PROBE: NOT DETECTED
B PERT DNA SPEC QL NAA+PROBE: NOT DETECTED
B-OH-BUTYR SERPL-SCNC: 0.8 MMOL/L (ref 0.02–0.27)
BASE EXCESS BLDV CALC-SCNC: 7.7 MMOL/L (ref -2–2)
BASOPHILS # BLD AUTO: 0.04 10*3/MM3 (ref 0–0.2)
BASOPHILS NFR BLD AUTO: 0.4 % (ref 0–1.5)
BDY SITE: ABNORMAL
BILIRUB SERPL-MCNC: 0.3 MG/DL (ref 0–1.2)
BODY TEMPERATURE: 37 C
BUN SERPL-MCNC: 11 MG/DL (ref 6–20)
BUN/CREAT SERPL: 14.1 (ref 7–25)
C PNEUM DNA NPH QL NAA+NON-PROBE: NOT DETECTED
CALCIUM SPEC-SCNC: 10.6 MG/DL (ref 8.6–10.5)
CHLORIDE SERPL-SCNC: 91 MMOL/L (ref 98–107)
CO2 BLDA-SCNC: 34.3 MMOL/L (ref 22–33)
CO2 SERPL-SCNC: 28 MMOL/L (ref 22–29)
COHGB MFR BLD: 3.1 %
CREAT SERPL-MCNC: 0.78 MG/DL (ref 0.76–1.27)
D-LACTATE SERPL-SCNC: 1.3 MMOL/L (ref 0.5–2)
D-LACTATE SERPL-SCNC: 2.2 MMOL/L (ref 0.5–2)
DEPRECATED RDW RBC AUTO: 38.5 FL (ref 37–54)
EGFRCR SERPLBLD CKD-EPI 2021: 116.3 ML/MIN/1.73
EOSINOPHIL # BLD AUTO: 0.02 10*3/MM3 (ref 0–0.4)
EOSINOPHIL NFR BLD AUTO: 0.2 % (ref 0.3–6.2)
EPAP: 0
ERYTHROCYTE [DISTWIDTH] IN BLOOD BY AUTOMATED COUNT: 12.4 % (ref 12.3–15.4)
FLUAV SUBTYP SPEC NAA+PROBE: NOT DETECTED
FLUBV RNA ISLT QL NAA+PROBE: NOT DETECTED
GLOBULIN UR ELPH-MCNC: 5.2 GM/DL
GLUCOSE BLDC GLUCOMTR-MCNC: 267 MG/DL (ref 70–130)
GLUCOSE BLDC GLUCOMTR-MCNC: 342 MG/DL (ref 70–130)
GLUCOSE SERPL-MCNC: 455 MG/DL (ref 65–99)
HADV DNA SPEC NAA+PROBE: NOT DETECTED
HCO3 BLDV-SCNC: 32.8 MMOL/L (ref 22–28)
HCOV 229E RNA SPEC QL NAA+PROBE: NOT DETECTED
HCOV HKU1 RNA SPEC QL NAA+PROBE: NOT DETECTED
HCOV NL63 RNA SPEC QL NAA+PROBE: NOT DETECTED
HCOV OC43 RNA SPEC QL NAA+PROBE: NOT DETECTED
HCT VFR BLD AUTO: 46.5 % (ref 37.5–51)
HGB BLD-MCNC: 15 G/DL (ref 13–17.7)
HGB BLDA-MCNC: 13.2 G/DL (ref 13.5–17.5)
HMPV RNA NPH QL NAA+NON-PROBE: NOT DETECTED
HPIV1 RNA ISLT QL NAA+PROBE: NOT DETECTED
HPIV2 RNA SPEC QL NAA+PROBE: NOT DETECTED
HPIV3 RNA NPH QL NAA+PROBE: NOT DETECTED
HPIV4 P GENE NPH QL NAA+PROBE: NOT DETECTED
IMM GRANULOCYTES # BLD AUTO: 0.03 10*3/MM3 (ref 0–0.05)
IMM GRANULOCYTES NFR BLD AUTO: 0.3 % (ref 0–0.5)
INHALED O2 CONCENTRATION: 21 %
IPAP: 0
LYMPHOCYTES # BLD AUTO: 2.11 10*3/MM3 (ref 0.7–3.1)
LYMPHOCYTES NFR BLD AUTO: 19 % (ref 19.6–45.3)
M PNEUMO IGG SER IA-ACNC: NOT DETECTED
MCH RBC QN AUTO: 27.4 PG (ref 26.6–33)
MCHC RBC AUTO-ENTMCNC: 32.3 G/DL (ref 31.5–35.7)
MCV RBC AUTO: 84.9 FL (ref 79–97)
METHGB BLD QL: 0.5 %
MODALITY: ABNORMAL
MONOCYTES # BLD AUTO: 1.16 10*3/MM3 (ref 0.1–0.9)
MONOCYTES NFR BLD AUTO: 10.5 % (ref 5–12)
NEUTROPHILS NFR BLD AUTO: 69.6 % (ref 42.7–76)
NEUTROPHILS NFR BLD AUTO: 7.74 10*3/MM3 (ref 1.7–7)
NOTE: ABNORMAL
NRBC BLD AUTO-RTO: 0 /100 WBC (ref 0–0.2)
OXYHGB MFR BLDV: 49.7 %
PAW @ PEAK INSP FLOW SETTING VENT: 0 CMH2O
PCO2 BLDV: 46.9 MM HG (ref 41–51)
PH BLDV: 7.45 PH UNITS (ref 7.31–7.41)
PLATELET # BLD AUTO: 479 10*3/MM3 (ref 140–450)
PMV BLD AUTO: 9.7 FL (ref 6–12)
PO2 BLDV: 25.7 MM HG (ref 27–53)
POTASSIUM SERPL-SCNC: 4.8 MMOL/L (ref 3.5–5.2)
PROT SERPL-MCNC: 9.5 G/DL (ref 6–8.5)
RBC # BLD AUTO: 5.48 10*6/MM3 (ref 4.14–5.8)
RHINOVIRUS RNA SPEC NAA+PROBE: NOT DETECTED
RSV RNA NPH QL NAA+NON-PROBE: NOT DETECTED
SARS-COV-2 RNA NPH QL NAA+NON-PROBE: DETECTED
SODIUM SERPL-SCNC: 133 MMOL/L (ref 136–145)
TOTAL RATE: 0 BREATHS/MINUTE
WBC NRBC COR # BLD: 11.1 10*3/MM3 (ref 3.4–10.8)

## 2022-12-25 PROCEDURE — 96361 HYDRATE IV INFUSION ADD-ON: CPT

## 2022-12-25 PROCEDURE — 99284 EMERGENCY DEPT VISIT MOD MDM: CPT

## 2022-12-25 PROCEDURE — 96374 THER/PROPH/DIAG INJ IV PUSH: CPT

## 2022-12-25 PROCEDURE — 83605 ASSAY OF LACTIC ACID: CPT | Performed by: PHYSICIAN ASSISTANT

## 2022-12-25 PROCEDURE — 85025 COMPLETE CBC W/AUTO DIFF WBC: CPT | Performed by: PHYSICIAN ASSISTANT

## 2022-12-25 PROCEDURE — 96375 TX/PRO/DX INJ NEW DRUG ADDON: CPT

## 2022-12-25 PROCEDURE — 82962 GLUCOSE BLOOD TEST: CPT

## 2022-12-25 PROCEDURE — 63710000001 INSULIN REGULAR HUMAN PER 5 UNITS: Performed by: PHYSICIAN ASSISTANT

## 2022-12-25 PROCEDURE — 0202U NFCT DS 22 TRGT SARS-COV-2: CPT | Performed by: PHYSICIAN ASSISTANT

## 2022-12-25 PROCEDURE — 36415 COLL VENOUS BLD VENIPUNCTURE: CPT

## 2022-12-25 PROCEDURE — 25010000002 METOCLOPRAMIDE PER 10 MG: Performed by: PHYSICIAN ASSISTANT

## 2022-12-25 PROCEDURE — 25010000002 KETOROLAC TROMETHAMINE PER 15 MG: Performed by: PHYSICIAN ASSISTANT

## 2022-12-25 PROCEDURE — 25010000002 DIPHENHYDRAMINE PER 50 MG: Performed by: PHYSICIAN ASSISTANT

## 2022-12-25 PROCEDURE — 82010 KETONE BODYS QUAN: CPT | Performed by: PHYSICIAN ASSISTANT

## 2022-12-25 PROCEDURE — 82805 BLOOD GASES W/O2 SATURATION: CPT

## 2022-12-25 PROCEDURE — 71045 X-RAY EXAM CHEST 1 VIEW: CPT

## 2022-12-25 PROCEDURE — 80053 COMPREHEN METABOLIC PANEL: CPT | Performed by: PHYSICIAN ASSISTANT

## 2022-12-25 RX ORDER — METOCLOPRAMIDE HYDROCHLORIDE 5 MG/ML
10 INJECTION INTRAMUSCULAR; INTRAVENOUS ONCE
Status: COMPLETED | OUTPATIENT
Start: 2022-12-25 | End: 2022-12-25

## 2022-12-25 RX ORDER — KETOROLAC TROMETHAMINE 15 MG/ML
15 INJECTION, SOLUTION INTRAMUSCULAR; INTRAVENOUS ONCE
Status: COMPLETED | OUTPATIENT
Start: 2022-12-25 | End: 2022-12-25

## 2022-12-25 RX ORDER — DIPHENHYDRAMINE HYDROCHLORIDE 50 MG/ML
25 INJECTION INTRAMUSCULAR; INTRAVENOUS ONCE
Status: COMPLETED | OUTPATIENT
Start: 2022-12-25 | End: 2022-12-25

## 2022-12-25 RX ADMIN — SODIUM CHLORIDE, POTASSIUM CHLORIDE, SODIUM LACTATE AND CALCIUM CHLORIDE 2478 ML: 600; 310; 30; 20 INJECTION, SOLUTION INTRAVENOUS at 16:51

## 2022-12-25 RX ADMIN — INSULIN HUMAN 10 UNITS: 100 INJECTION, SOLUTION PARENTERAL at 18:06

## 2022-12-25 RX ADMIN — KETOROLAC TROMETHAMINE 15 MG: 15 INJECTION, SOLUTION INTRAMUSCULAR; INTRAVENOUS at 16:46

## 2022-12-25 RX ADMIN — METOCLOPRAMIDE 10 MG: 5 INJECTION, SOLUTION INTRAMUSCULAR; INTRAVENOUS at 16:47

## 2022-12-25 RX ADMIN — DIPHENHYDRAMINE HYDROCHLORIDE 25 MG: 50 INJECTION INTRAMUSCULAR; INTRAVENOUS at 16:46

## 2022-12-25 NOTE — Clinical Note
[facility] EMERGENCY DEPARTMENT  [address] [facility] [address]  Phone: [phone]    Sherwin Lund was seen and treated in our emergency department on 12/25/2022.  He may return to work on 01/03/2023.         Thank you for choosing [facility].    Chicho Barrios PA-C

## 2022-12-25 NOTE — ED PROVIDER NOTES
EMERGENCY DEPARTMENT ENCOUNTER    Pt Name: Sherwin Lund  MRN: [id number]  Pt :   1985  Room Number:  26SF/26  Date of encounter:  2022  PCP: Shiloh Miles APRN  ED Provider: Chicho Barrios PA-C    Historian: Patient      HPI:  Chief Complaint: 5-day history of congestion cough body aches fever sweats fatigue headache        Context: Sherwin Lund is a 37 y.o. male who presents to the ED c/o 5-day history of \"migraine\" headache, head congestion, sore throat, nonproductive cough, body aches, fatigue, and feeling dehydrated.  Patient has not been vaccinated against COVID-19 or influenza.  He denies stiff neck vision changes or photophobia.  No unilateral weakness paresis or paresthesias.  No nausea vomiting or diarrhea.  No sputum hemoptysis or shortness of breath.  No rash.  He does have a history of type 2 diabetes.  He smokes 1/2 pack of cigarettes per day.  Medication and allergy lists reviewed.      PAST MEDICAL HISTORY   Past Medical History:   Diagnosis Date   • Diabetes mellitus (HCC)          PAST SURGICAL HISTORY  History reviewed. No pertinent surgical history.      FAMILY HISTORY  Family History   Problem Relation Age of Onset   • Diabetes Mother    • Hypertension Father    • Diabetes Other          SOCIAL HISTORY  Social History     Socioeconomic History   • Marital status: Single   Tobacco Use   • Smoking status: Every Day     Packs/day: 0.50     Years: 6.00     Pack years: 3.00     Types: Cigarettes   • Smokeless tobacco: Never   Substance and Sexual Activity   • Alcohol use: No   • Drug use: No   • Sexual activity: Defer         ALLERGIES  Patient has no known allergies.        REVIEW OF SYSTEMS  Review of Systems   Constitutional: Positive for activity change, appetite change, chills and fatigue.   HENT: Positive for congestion, rhinorrhea and sore throat.    Eyes: Negative for photophobia and visual disturbance.   Respiratory: Positive for cough. Negative for shortness  of breath and stridor.    Cardiovascular: Negative for chest pain and palpitations.   Gastrointestinal: Negative for abdominal pain, nausea and vomiting.   Genitourinary: Positive for decreased urine volume. Negative for dysuria, flank pain and hematuria.   Musculoskeletal: Positive for arthralgias, back pain and myalgias. Negative for neck pain and neck stiffness.   Neurological: Positive for headaches. Negative for dizziness and syncope.          All systems reviewed and negative except for those discussed in HPI.       PHYSICAL EXAM    I have reviewed the triage vital signs and nursing notes.    ED Triage Vitals [12/25/22 1522]   Temp Heart Rate Resp BP SpO2   98.8 °F (37.1 °C) (!) 122 16 (!) 135/101 96 %      Temp src Heart Rate Source Patient Position BP Location FiO2 (%)   Oral Monitor Sitting Left arm --       Physical Exam  GENERAL:   Appears in no acute distress.  Ill-appearing, but nontoxic appearing.  Heart rate 122, afebrile.  HENT: Nares patent.  Oral membranes are dry airway is patent uvula is midline.  Neck is supple with no adenopathy or meningismus   EYES: No scleral icterus.  CV: Regular rhythm, rate around 120, regular.  No murmurs rubs or gallops.  RESPIRATORY: Normal effort.  No audible wheezes, rales or rhonchi.  No tachypnea or respiratory distress or accessory muscle use.  ABDOMEN: Soft, nontender  MUSCULOSKELETAL: No deformities.   NEURO: Alert, moves all extremities, follows commands.  SKIN: Warm, dry, no rash visualized.  No rash, poor turgor      LAB RESULTS  Recent Results (from the past 24 hour(s))   Comprehensive Metabolic Panel    Collection Time: 12/25/22  4:39 PM    Specimen: Blood   Result Value Ref Range    Glucose 455 (C) 65 - 99 mg/dL    BUN 11 6 - 20 mg/dL    Creatinine 0.78 0.76 - 1.27 mg/dL    Sodium 133 (L) 136 - 145 mmol/L    Potassium 4.8 3.5 - 5.2 mmol/L    Chloride 91 (L) 98 - 107 mmol/L    CO2 28.0 22.0 - 29.0 mmol/L    Calcium 10.6 (H) 8.6 - 10.5 mg/dL    Total Protein  9.5 (H) 6.0 - 8.5 g/dL    Albumin 4.30 3.50 - 5.20 g/dL    ALT (SGPT) 8 1 - 41 U/L    AST (SGOT) 10 1 - 40 U/L    Alkaline Phosphatase 104 39 - 117 U/L    Total Bilirubin 0.3 0.0 - 1.2 mg/dL    Globulin 5.2 gm/dL    A/G Ratio 0.8 g/dL    BUN/Creatinine Ratio 14.1 7.0 - 25.0    Anion Gap 14.0 5.0 - 15.0 mmol/L    eGFR 116.3 >60.0 mL/min/1.73   CBC Auto Differential    Collection Time: 12/25/22  4:39 PM    Specimen: Blood   Result Value Ref Range    WBC 11.10 (H) 3.40 - 10.80 10*3/mm3    RBC 5.48 4.14 - 5.80 10*6/mm3    Hemoglobin 15.0 13.0 - 17.7 g/dL    Hematocrit 46.5 37.5 - 51.0 %    MCV 84.9 79.0 - 97.0 fL    MCH 27.4 26.6 - 33.0 pg    MCHC 32.3 31.5 - 35.7 g/dL    RDW 12.4 12.3 - 15.4 %    RDW-SD 38.5 37.0 - 54.0 fl    MPV 9.7 6.0 - 12.0 fL    Platelets 479 (H) 140 - 450 10*3/mm3    Neutrophil % 69.6 42.7 - 76.0 %    Lymphocyte % 19.0 (L) 19.6 - 45.3 %    Monocyte % 10.5 5.0 - 12.0 %    Eosinophil % 0.2 (L) 0.3 - 6.2 %    Basophil % 0.4 0.0 - 1.5 %    Immature Grans % 0.3 0.0 - 0.5 %    Neutrophils, Absolute 7.74 (H) 1.70 - 7.00 10*3/mm3    Lymphocytes, Absolute 2.11 0.70 - 3.10 10*3/mm3    Monocytes, Absolute 1.16 (H) 0.10 - 0.90 10*3/mm3    Eosinophils, Absolute 0.02 0.00 - 0.40 10*3/mm3    Basophils, Absolute 0.04 0.00 - 0.20 10*3/mm3    Immature Grans, Absolute 0.03 0.00 - 0.05 10*3/mm3    nRBC 0.0 0.0 - 0.2 /100 WBC   Lactic Acid, Plasma    Collection Time: 12/25/22  4:39 PM    Specimen: Blood   Result Value Ref Range    Lactate 2.2 (C) 0.5 - 2.0 mmol/L   Beta Hydroxybutyrate Quantitative    Collection Time: 12/25/22  4:39 PM    Specimen: Blood   Result Value Ref Range    Beta-Hydroxybutyrate Quant 0.803 (H) 0.020 - 0.270 mmol/L   Respiratory Panel PCR w/COVID-19(SARS-CoV-2) JACQUI/BERONICA/VAISHALI/PAD/COR/MAD/GERMAN In-House, NP Swab in [facility], 3-4 HR TAT - Swab, Nasopharynx    Collection Time: 12/25/22  4:41 PM    Specimen: Nasopharynx; Swab   Result Value Ref Range    ADENOVIRUS, PCR Not Detected Not Detected     Coronavirus 229E Not Detected Not Detected    Coronavirus HKU1 Not Detected Not Detected    Coronavirus NL63 Not Detected Not Detected    Coronavirus OC43 Not Detected Not Detected    COVID19 Detected (C) Not Detected - Ref. Range    Human Metapneumovirus Not Detected Not Detected    Human Rhinovirus/Enterovirus Not Detected Not Detected    Influenza A PCR Not Detected Not Detected    Influenza B PCR Not Detected Not Detected    Parainfluenza Virus 1 Not Detected Not Detected    Parainfluenza Virus 2 Not Detected Not Detected    Parainfluenza Virus 3 Not Detected Not Detected    Parainfluenza Virus 4 Not Detected Not Detected    RSV, PCR Not Detected Not Detected    Bordetella pertussis pcr Not Detected Not Detected    Bordetella parapertussis PCR Not Detected Not Detected    Chlamydophila pneumoniae PCR Not Detected Not Detected    Mycoplasma pneumo by PCR Not Detected Not Detected   Blood Gas, Venous With Co-Ox    Collection Time: 12/25/22  5:59 PM    Specimen: Venous Blood   Result Value Ref Range    Site Nurse/Dr Draw     pH, Venous 7.454 (H) 7.310 - 7.410 pH Units    pCO2, Venous 46.9 41.0 - 51.0 mm Hg    pO2, Venous 25.7 (L) 27.0 - 53.0 mm Hg    HCO3, Venous 32.8 (H) 22.0 - 28.0 mmol/L    Base Excess, Venous 7.7 (H) -2.0 - 2.0 mmol/L    Hemoglobin, Blood Gas 13.2 (L) 13.5 - 17.5 g/dL    Oxyhemoglobin Venous 49.7 %    Methemoglobin Venous 0.5 %    Carboxyhemoglobin Venous 3.1 %    CO2 Content 34.3 (H) 22 - 33 mmol/L    Temperature 37.0 C    Barometric Pressure for Blood Gas      Modality Room Air     FIO2 21 %    Rate 0 Breaths/minute    PIP 0 cmH2O    IPAP 0     EPAP 0     Note     POC Glucose Once    Collection Time: 12/25/22  6:47 PM    Specimen: Blood   Result Value Ref Range    Glucose 342 (H) 70 - 130 mg/dL   POC Glucose Once    Collection Time: 12/25/22  7:59 PM    Specimen: Blood   Result Value Ref Range    Glucose 267 (H) 70 - 130 mg/dL       If labs were ordered, I independently reviewed the results  and considered them in treating the patient.        RADIOLOGY  XR Chest 1 View    Result Date: 12/25/2022  DATE OF EXAM: 12/25/2022 4:36 PM  PROCEDURE: XR CHEST 1 VW-  INDICATIONS: cough, fever, HA, body aches x 5d  COMPARISON: No comparisons available.  TECHNIQUE: Single radiographic AP view of the chest was obtained.  FINDINGS: The heart, mediastinum, and pulmonary vasculature appear to be within normal limits. The lungs appear well-expanded and clear. No evidence of effusion or pneumothorax is seen. Bony structures appear to be intact. A couple of small benign bone islands appear to be present in the right glenoid and proximal humerus no sclerotic bony changes are seen elsewhere.      No evidence of pneumonia or other active chest disease.  This report was finalized on 12/25/2022 5:32 PM by Dr. Phil Crowe MD.              PROCEDURES    Procedures    No orders to display       MEDICATIONS GIVEN IN ER    Medications   ketorolac (TORADOL) injection 15 mg (15 mg Intravenous Given 12/25/22 1646)   metoclopramide (REGLAN) injection 10 mg (10 mg Intravenous Given 12/25/22 1647)   diphenhydrAMINE (BENADRYL) injection 25 mg (25 mg Intravenous Given 12/25/22 1646)   insulin regular (humuLIN R,novoLIN R) injection 10 Units (10 Units Intravenous Given 12/25/22 1806)         MEDICAL DECISION MAKING, PROGRESS, and CONSULTS    All labs have been independently reviewed by me.  All radiology studies have been reviewed by me and the radiologist dictating the report.  All EKG's have been independently viewed and interpreted by me.      Discussion below represents my analysis of pertinent findings related to patient's condition, differential diagnosis, treatment plan and final disposition.      Differential diagnosis:    Influenza, COVID, other acute viral syndrome, DKA, poorly controlled diabetes, dehydration, pneumonia      Additional sources:    - Discussed/ obtained information from independent historians:     - External  (non-ED) record review: Reviewed other hospital records/visits.    - Chronic or social conditions impacting care: History of medical noncompliance    - Shared decision making: Reviewed findings at bedside with patient.  Recommend overnight admission to correct hyperglycemia, correct dehydration, and monitor for any change or worsening.      Orders placed during this visit:  Orders Placed This Encounter   Procedures   • COVID PRE-OP / PRE-PROCEDURE SCREENING ORDER (NO ISOLATION) - Swab, Nasopharynx   • Respiratory Panel PCR w/COVID-19(SARS-CoV-2) JACQUI/BERONICA/VAISHALI/PAD/COR/MAD/GERMAN In-House, NP Swab in UTM/VTM, 3-4 HR TAT - Swab, Nasopharynx   • XR Chest 1 View   • Comprehensive Metabolic Panel   • CBC Auto Differential   • Lactic Acid, Plasma   • STAT Lactic Acid, Reflex   • Blood Gas, Venous -With Co-Ox Panel: Yes   • Beta Hydroxybutyrate Quantitative   • Blood Gas, Venous With Co-Ox   • POC Glucose Once   • POC Glucose Once   • CBC & Differential   • Ketone Bodies, Serum (Not performed at [place])         Additional orders considered but not ordered:    ED Course:    Consultants:      ED Course as of 12/25/22 2008   Sun Dec 25, 2022   1714 Lactate(!!): 2.2 [TG]   1803 pH, Venous(!): 7.454 [TG]   1823 COVID19(!!): Detected [TG]      ED Course User Index  [TG] Chicho Barrios PA-C              Patient remained stable throughout course of stay in the emergency department.  He was initially started with a bolus of LR, however when his glucose resulted, that was discontinued and he was switched to normal saline.  IV regular insulin 10 units administered.  He is COVID-positive.  He has not been vaccinated.    AS OF 20:08 EST VITALS:    BP - 117/80  HR - 95  TEMP - 98.8 °F (37.1 °C) (Oral)  O2 SATS - 96%                  DIAGNOSIS  Final diagnoses:   COVID-19 virus infection   Poorly controlled diabetes mellitus (HCC)   Dehydration         DISPOSITION  DISCHARGE    Patient discharged in stable condition.    Reviewed  implications of results, diagnosis, meds, responsibility to follow up, warning signs and symptoms of possible worsening, potential complications and reasons to return to ER.    Patient/Family voiced understanding of above instructions.    Discussed plan for discharge, as there is no emergent indication for admission.  Pt/family is agreeable and understands need for follow up and possible repeat testing.  Pt/family is aware that discharge does not mean that nothing is wrong but that it indicates no emergency is currently present that requires admission and they must continue care with follow-up as given below or with a physician of their choice.     FOLLOW-UP  Shiloh Miles, APRN  [address] [facility]  [address]  [place] [address]  [phone]    Call   Call tomorrow morning to advise them of your visit here today.         Medication List      New Prescriptions    Nirmatrelvir&Ritonavir 300/100 20 x 150 MG & 10 x 100MG tablet therapy pack tablet  Commonly known as: PAXLOVID  Take 3 tablets by mouth 2 (Two) Times a Day for 5 days.           Where to Get Your Medications      These medications were sent to [id number] - [place], [place] - [address] [facility] [address] - [phone]  - [phone] FX  [address], [facility] [address]    Phone: [phone]   · Nirmatrelvir&Ritonavir 300/100 20 x 150 MG & 10 x 100MG tablet therapy pack tablet             Please note that portions of this document were completed with voice recognition software.      Chicho Barrios PA-C  12/25/22 2008

## 2022-12-26 NOTE — DISCHARGE INSTRUCTIONS
According to CDC guidelines unvaccinated COVID-19 patients must self isolate for 10 days from the onset of symptoms.  Monitor your oxygen levels at home with the pulse oximeter provided to you today.  If your oxygen levels drop below 94% on room air at rest, return to the emergency department for reevaluation.  Alternate Tylenol and ibuprofen every 4 hours to control fever and body aches.  Be sure to stay well-hydrated.  Call your primary care provider's office tomorrow to discuss restarting your diabetes medications.  Return to the emergency department immediately if any change or worsening of symptoms.

## 2024-02-06 ENCOUNTER — HOSPITAL ENCOUNTER (EMERGENCY)
Facility: HOSPITAL | Age: 39
Discharge: HOME | End: 2024-02-06
Attending: EMERGENCY MEDICINE | Admitting: EMERGENCY MEDICINE
Payer: COMMERCIAL

## 2024-02-06 ENCOUNTER — APPOINTMENT (OUTPATIENT)
Dept: CT IMAGING | Facility: HOSPITAL | Age: 39
End: 2024-02-06
Payer: COMMERCIAL

## 2024-02-06 ENCOUNTER — APPOINTMENT (OUTPATIENT)
Dept: GENERAL RADIOLOGY | Facility: HOSPITAL | Age: 39
End: 2024-02-06
Payer: COMMERCIAL

## 2024-02-06 VITALS
TEMPERATURE: 98.1 F | OXYGEN SATURATION: 99 % | BODY MASS INDEX: 20.73 KG/M2 | HEIGHT: 69 IN | WEIGHT: 140 LBS | SYSTOLIC BLOOD PRESSURE: 153 MMHG | HEART RATE: 73 BPM | DIASTOLIC BLOOD PRESSURE: 82 MMHG | RESPIRATION RATE: 15 BRPM

## 2024-02-06 DIAGNOSIS — Z86.39 HISTORY OF DIABETES MELLITUS: ICD-10-CM

## 2024-02-06 DIAGNOSIS — R10.9 ACUTE RIGHT FLANK PAIN: Primary | ICD-10-CM

## 2024-02-06 DIAGNOSIS — R82.81 PYURIA: ICD-10-CM

## 2024-02-06 LAB
ALBUMIN SERPL-MCNC: 4.5 G/DL (ref 3.5–5.2)
ALBUMIN/GLOB SERPL: 1.8 G/DL
ALP SERPL-CCNC: 59 U/L (ref 39–117)
ALT SERPL W P-5'-P-CCNC: 20 U/L (ref 1–41)
ANION GAP SERPL CALCULATED.3IONS-SCNC: 9 MMOL/L (ref 5–15)
AST SERPL-CCNC: 22 U/L (ref 1–40)
BACTERIA UR QL AUTO: ABNORMAL /HPF
BASOPHILS # BLD AUTO: 0.06 10*3/MM3 (ref 0–0.2)
BASOPHILS NFR BLD AUTO: 1.1 % (ref 0–1.5)
BILIRUB SERPL-MCNC: 0.2 MG/DL (ref 0–1.2)
BILIRUB UR QL STRIP: NEGATIVE
BUN SERPL-MCNC: 9 MG/DL (ref 6–20)
BUN/CREAT SERPL: 9.8 (ref 7–25)
CALCIUM SPEC-SCNC: 9.4 MG/DL (ref 8.6–10.5)
CHLORIDE SERPL-SCNC: 103 MMOL/L (ref 98–107)
CLARITY UR: CLEAR
CO2 SERPL-SCNC: 29 MMOL/L (ref 22–29)
COLOR UR: YELLOW
CREAT SERPL-MCNC: 0.92 MG/DL (ref 0.76–1.27)
DEPRECATED RDW RBC AUTO: 44.1 FL (ref 37–54)
EGFRCR SERPLBLD CKD-EPI 2021: 107.8 ML/MIN/1.73
EOSINOPHIL # BLD AUTO: 0.12 10*3/MM3 (ref 0–0.4)
EOSINOPHIL NFR BLD AUTO: 2.1 % (ref 0.3–6.2)
ERYTHROCYTE [DISTWIDTH] IN BLOOD BY AUTOMATED COUNT: 13.9 % (ref 12.3–15.4)
GLOBULIN UR ELPH-MCNC: 2.5 GM/DL
GLUCOSE SERPL-MCNC: 220 MG/DL (ref 65–99)
GLUCOSE UR STRIP-MCNC: ABNORMAL MG/DL
HCT VFR BLD AUTO: 41.7 % (ref 37.5–51)
HGB BLD-MCNC: 13.8 G/DL (ref 13–17.7)
HGB UR QL STRIP.AUTO: NEGATIVE
HOLD SPECIMEN: NORMAL
HOLD SPECIMEN: NORMAL
HYALINE CASTS UR QL AUTO: ABNORMAL /LPF
IMM GRANULOCYTES # BLD AUTO: 0.01 10*3/MM3 (ref 0–0.05)
IMM GRANULOCYTES NFR BLD AUTO: 0.2 % (ref 0–0.5)
KETONES UR QL STRIP: NEGATIVE
LEUKOCYTE ESTERASE UR QL STRIP.AUTO: ABNORMAL
LIPASE SERPL-CCNC: 23 U/L (ref 13–60)
LYMPHOCYTES # BLD AUTO: 2.85 10*3/MM3 (ref 0.7–3.1)
LYMPHOCYTES NFR BLD AUTO: 49.9 % (ref 19.6–45.3)
MCH RBC QN AUTO: 28.6 PG (ref 26.6–33)
MCHC RBC AUTO-ENTMCNC: 33.1 G/DL (ref 31.5–35.7)
MCV RBC AUTO: 86.5 FL (ref 79–97)
MONOCYTES # BLD AUTO: 0.45 10*3/MM3 (ref 0.1–0.9)
MONOCYTES NFR BLD AUTO: 7.9 % (ref 5–12)
NEUTROPHILS NFR BLD AUTO: 2.22 10*3/MM3 (ref 1.7–7)
NEUTROPHILS NFR BLD AUTO: 38.8 % (ref 42.7–76)
NITRITE UR QL STRIP: NEGATIVE
NRBC BLD AUTO-RTO: 0 /100 WBC (ref 0–0.2)
NT-PROBNP SERPL-MCNC: <36 PG/ML (ref 0–450)
PH UR STRIP.AUTO: 5.5 [PH] (ref 5–8)
PLATELET # BLD AUTO: 259 10*3/MM3 (ref 140–450)
PMV BLD AUTO: 9.4 FL (ref 6–12)
POTASSIUM SERPL-SCNC: 3.8 MMOL/L (ref 3.5–5.2)
PROT SERPL-MCNC: 7 G/DL (ref 6–8.5)
PROT UR QL STRIP: ABNORMAL
RBC # BLD AUTO: 4.82 10*6/MM3 (ref 4.14–5.8)
RBC # UR STRIP: ABNORMAL /HPF
REF LAB TEST METHOD: ABNORMAL
SODIUM SERPL-SCNC: 141 MMOL/L (ref 136–145)
SP GR UR STRIP: 1.03 (ref 1–1.03)
SQUAMOUS #/AREA URNS HPF: ABNORMAL /HPF
TROPONIN T SERPL HS-MCNC: 8 NG/L
UROBILINOGEN UR QL STRIP: ABNORMAL
WBC # UR STRIP: ABNORMAL /HPF
WBC NRBC COR # BLD AUTO: 5.71 10*3/MM3 (ref 3.4–10.8)
WHOLE BLOOD HOLD COAG: NORMAL
WHOLE BLOOD HOLD SPECIMEN: NORMAL

## 2024-02-06 PROCEDURE — 84484 ASSAY OF TROPONIN QUANT: CPT | Performed by: EMERGENCY MEDICINE

## 2024-02-06 PROCEDURE — 93005 ELECTROCARDIOGRAM TRACING: CPT

## 2024-02-06 PROCEDURE — 83880 ASSAY OF NATRIURETIC PEPTIDE: CPT | Performed by: EMERGENCY MEDICINE

## 2024-02-06 PROCEDURE — 81001 URINALYSIS AUTO W/SCOPE: CPT | Performed by: EMERGENCY MEDICINE

## 2024-02-06 PROCEDURE — 71045 X-RAY EXAM CHEST 1 VIEW: CPT

## 2024-02-06 PROCEDURE — 80053 COMPREHEN METABOLIC PANEL: CPT | Performed by: EMERGENCY MEDICINE

## 2024-02-06 PROCEDURE — 87086 URINE CULTURE/COLONY COUNT: CPT | Performed by: EMERGENCY MEDICINE

## 2024-02-06 PROCEDURE — 87491 CHLMYD TRACH DNA AMP PROBE: CPT | Performed by: EMERGENCY MEDICINE

## 2024-02-06 PROCEDURE — 93005 ELECTROCARDIOGRAM TRACING: CPT | Performed by: EMERGENCY MEDICINE

## 2024-02-06 PROCEDURE — 85025 COMPLETE CBC W/AUTO DIFF WBC: CPT | Performed by: EMERGENCY MEDICINE

## 2024-02-06 PROCEDURE — 83690 ASSAY OF LIPASE: CPT | Performed by: EMERGENCY MEDICINE

## 2024-02-06 PROCEDURE — 87591 N.GONORRHOEAE DNA AMP PROB: CPT | Performed by: EMERGENCY MEDICINE

## 2024-02-06 PROCEDURE — 36415 COLL VENOUS BLD VENIPUNCTURE: CPT

## 2024-02-06 PROCEDURE — 99284 EMERGENCY DEPT VISIT MOD MDM: CPT

## 2024-02-06 PROCEDURE — 74176 CT ABD & PELVIS W/O CONTRAST: CPT

## 2024-02-06 RX ORDER — CEFUROXIME AXETIL 500 MG/1
500 TABLET ORAL 2 TIMES DAILY
Qty: 14 TABLET | Refills: 0 | Status: SHIPPED | OUTPATIENT
Start: 2024-02-06 | End: 2024-02-13

## 2024-02-06 RX ORDER — SODIUM CHLORIDE 0.9 % (FLUSH) 0.9 %
10 SYRINGE (ML) INJECTION AS NEEDED
Status: DISCONTINUED | OUTPATIENT
Start: 2024-02-06 | End: 2024-02-07 | Stop reason: HOSPADM

## 2024-02-06 NOTE — Clinical Note
[facility] EMERGENCY DEPARTMENT  [address]  [facility] [address]  Phone: [phone]    Sherwin Lund was seen and treated in our emergency department on 2/6/2024.  He may return to work on 02/08/2024.         Thank you for choosing [facility].    Modesta Dougherty RN

## 2024-02-06 NOTE — Clinical Note
[facility] EMERGENCY DEPARTMENT  [address]  [facility] [address]  Phone: [phone]    Sherwin Lund was seen and treated in our emergency department on 2/6/2024.  He may return to work on 02/08/2024.         Thank you for choosing [facility].    Modesta Dougherty RN

## 2024-02-06 NOTE — Clinical Note
[facility] EMERGENCY DEPARTMENT  [address]  [facility] [address]  Phone: [phone]    Sherwin Lund was seen and treated in our emergency department on 2/6/2024.  He may return to work on 02/08/2024.         Thank you for choosing [facility].    Modesta Dougherty RN

## 2024-02-07 LAB
HOLD SPECIMEN: NORMAL
QT INTERVAL: 364 MS
QTC INTERVAL: 409 MS

## 2024-02-07 NOTE — ED PROVIDER NOTES
[place]    EMERGENCY DEPARTMENT ENCOUNTER      Pt Name: Sherwin Lund  MRN: [id number]  YOB: 1985  Date of evaluation: 2/6/2024  Provider: Alessandro Downing MD    CHIEF COMPLAINT       Chief Complaint   Patient presents with    Flank Pain    Shortness of Breath         HISTORY OF PRESENT ILLNESS   Sherwin Lund is a 38 y.o. male who presents to the emergency department with report of mild shortness of breath and right flank pain over the last couple days.  Patient states that he just got over bronchitis and was coughing forcefully during that episode.  Patient states that his pain is worse with movement and with coughing.  He denies any chest pain, fever, chills, urinary symptoms, vomiting, or diarrhea. Patient denies any history of VTE as well as any recent surgery, hospitalization, long distance travel, swelling or pain in the lower extremities, or exogenous hormone use.        Nursing notes were reviewed.    REVIEW OF SYSTEMS     ROS:  A chief complaint appropriate review of systems was completed and is negative except as noted in the HPI.      PAST MEDICAL HISTORY     Past Medical History:   Diagnosis Date    Diabetes mellitus          SURGICAL HISTORY     No past surgical history on file.      CURRENT MEDICATIONS     No current facility-administered medications for this encounter.    Current Outpatient Medications:     cefuroxime (CEFTIN) 500 MG tablet, Take 1 tablet by mouth 2 (Two) Times a Day for 7 days., Disp: 14 tablet, Rfl: 0    cephalexin (KEFLEX) 500 MG capsule, Take 1 capsule by mouth 2 (Two) Times a Day., Disp: 14 capsule, Rfl: 0    ondansetron ODT (ZOFRAN-ODT) 8 MG disintegrating tablet, Place 1 tablet on the tongue Every 8 (Eight) Hours As Needed for Nausea or Vomiting., Disp: 15 tablet, Rfl: 0    sulfamethoxazole-trimethoprim (BACTRIM DS,SEPTRA DS) 800-160 MG per tablet, Take 1 tablet by mouth 2 (Two) Times a Day., Disp: 14 tablet, Rfl: 0    ALLERGIES     Patient has no known  "allergies.    FAMILY HISTORY       Family History   Problem Relation Age of Onset    Diabetes Mother     Hypertension Father     Diabetes Other           SOCIAL HISTORY       Social History     Socioeconomic History    Marital status: Single   Tobacco Use    Smoking status: Every Day     Packs/day: 0.50     Years: 6.00     Additional pack years: 0.00     Total pack years: 3.00     Types: Cigarettes    Smokeless tobacco: Never   Substance and Sexual Activity    Alcohol use: No    Drug use: No    Sexual activity: Defer         PHYSICAL EXAM    (up to 7 for level 4, 8 or more for level 5)     Vitals:    02/06/24 1928   BP: 153/82   BP Location: Left arm   Patient Position: Sitting   Pulse: 73   Resp: 15   Temp: 98.1 °F (36.7 °C)   TempSrc: Oral   SpO2: 99%   Weight: 63.5 kg (140 lb)   Height: 175.3 cm (69\")       General: Awake, alert, no acute distress.  HEENT: Conjunctivae normal.  Neck: Trachea midline.  Cardiac: Heart regular rate, rhythm, no murmurs, rubs, or gallops  Lungs: Lungs are clear to auscultation, there is no wheezing, rhonchi, or rales. There is no use of accessory muscles.  Chest wall: There is no tenderness to palpation over the chest wall or over ribs  Abdomen: Abdomen is soft, nontender, nondistended. There are no firm or pulsatile masses, no rebound rigidity or guarding.   Musculoskeletal: No deformity.  Moderate tenderness over the right mid back/flank.  There is no midline tenderness or step-off of the back.  Neuro: Alert and oriented x 4.  Dermatology: Skin is warm and dry  Psych: Mentation is grossly normal, cognition is grossly normal. Affect is appropriate.        DIAGNOSTIC RESULTS     EKG: All EKGs are interpreted by the Emergency Department Physician who either signs or Co-signs this chart in the absence of a cardiologist.    ECG 12 Lead ED Triage Standing Order; SOA   Preliminary Result   Test Reason : ED Triage Standing Order~   Blood Pressure :   */*   mmHG   Vent. Rate :  76 BPM     " Atrial Rate :  76 BPM      P-R Int : 144 ms          QRS Dur :  74 ms       QT Int : 364 ms       P-R-T Axes :  84  32  79 degrees      QTc Int : 409 ms      Normal sinus rhythm   Anterior infarct , age undetermined   Abnormal ECG   No previous ECGs available      Referred By:            Confirmed By:             RADIOLOGY:   [x] Radiologist's Report Reviewed:  XR Chest 1 View   Final Result   Impression: No acute cardiopulmonary disease.      Electronically Signed: Paul Lundy MD     2/6/2024 9:33 PM EST     Workstation ID: [id number]      CT Abdomen Pelvis Without Contrast   Final Result   Impression:      No acute abdominopelvic findings on this noncontrast exam.         Electronically Signed: Ari Robertson MD     2/6/2024 9:03 PM EST     Workstation ID: [id number]          I ordered and independently reviewed the above noted radiographic studies.        LABS:    I have reviewed and interpreted all of the currently available lab results from this visit (if applicable):  Results for orders placed or performed during the hospital encounter of 02/06/24   Comprehensive Metabolic Panel    Specimen: Arm, Left; Blood   Result Value Ref Range    Glucose 220 (H) 65 - 99 mg/dL    BUN 9 6 - 20 mg/dL    Creatinine 0.92 0.76 - 1.27 mg/dL    Sodium 141 136 - 145 mmol/L    Potassium 3.8 3.5 - 5.2 mmol/L    Chloride 103 98 - 107 mmol/L    CO2 29.0 22.0 - 29.0 mmol/L    Calcium 9.4 8.6 - 10.5 mg/dL    Total Protein 7.0 6.0 - 8.5 g/dL    Albumin 4.5 3.5 - 5.2 g/dL    ALT (SGPT) 20 1 - 41 U/L    AST (SGOT) 22 1 - 40 U/L    Alkaline Phosphatase 59 39 - 117 U/L    Total Bilirubin 0.2 0.0 - 1.2 mg/dL    Globulin 2.5 gm/dL    A/G Ratio 1.8 g/dL    BUN/Creatinine Ratio 9.8 7.0 - 25.0    Anion Gap 9.0 5.0 - 15.0 mmol/L    eGFR 107.8 >60.0 mL/min/1.73   BNP    Specimen: Arm, Left; Blood   Result Value Ref Range    proBNP <36.0 0.0 - 450.0 pg/mL   Single High Sensitivity Troponin T    Specimen: Arm, Left; Blood   Result Value Ref Range    HS  Troponin T 8 <22 ng/L   CBC Auto Differential    Specimen: Arm, Left; Blood   Result Value Ref Range    WBC 5.71 3.40 - 10.80 10*3/mm3    RBC 4.82 4.14 - 5.80 10*6/mm3    Hemoglobin 13.8 13.0 - 17.7 g/dL    Hematocrit 41.7 37.5 - 51.0 %    MCV 86.5 79.0 - 97.0 fL    MCH 28.6 26.6 - 33.0 pg    MCHC 33.1 31.5 - 35.7 g/dL    RDW 13.9 12.3 - 15.4 %    RDW-SD 44.1 37.0 - 54.0 fl    MPV 9.4 6.0 - 12.0 fL    Platelets 259 140 - 450 10*3/mm3    Neutrophil % 38.8 (L) 42.7 - 76.0 %    Lymphocyte % 49.9 (H) 19.6 - 45.3 %    Monocyte % 7.9 5.0 - 12.0 %    Eosinophil % 2.1 0.3 - 6.2 %    Basophil % 1.1 0.0 - 1.5 %    Immature Grans % 0.2 0.0 - 0.5 %    Neutrophils, Absolute 2.22 1.70 - 7.00 10*3/mm3    Lymphocytes, Absolute 2.85 0.70 - 3.10 10*3/mm3    Monocytes, Absolute 0.45 0.10 - 0.90 10*3/mm3    Eosinophils, Absolute 0.12 0.00 - 0.40 10*3/mm3    Basophils, Absolute 0.06 0.00 - 0.20 10*3/mm3    Immature Grans, Absolute 0.01 0.00 - 0.05 10*3/mm3    nRBC 0.0 0.0 - 0.2 /100 WBC   Urinalysis With Microscopic If Indicated (No Culture) - Urine, Clean Catch    Specimen: Urine, Clean Catch   Result Value Ref Range    Color, UA Yellow Yellow, Straw    Appearance, UA Clear Clear    pH, UA 5.5 5.0 - 8.0    Specific Gravity, UA 1.034 (H) 1.001 - 1.030    Glucose,  mg/dL (Trace) (A) Negative    Ketones, UA Negative Negative    Bilirubin, UA Negative Negative    Blood, UA Negative Negative    Protein, UA Trace (A) Negative    Leuk Esterase, UA Small (1+) (A) Negative    Nitrite, UA Negative Negative    Urobilinogen, UA 1.0 E.U./dL 0.2 - 1.0 E.U./dL   Lipase    Specimen: Arm, Left; Blood   Result Value Ref Range    Lipase 23 13 - 60 U/L   Urinalysis, Microscopic Only - Urine, Clean Catch    Specimen: Urine, Clean Catch   Result Value Ref Range    RBC, UA 0-2 None Seen, 0-2 /HPF    WBC, UA Too Numerous to Count (A) None Seen, 0-2 /HPF    Bacteria, UA None Seen None Seen, Trace /HPF    Squamous Epithelial Cells, UA 3-6 (A) None Seen,  0-2 /HPF    Hyaline Casts, UA Too Numerous to Count 0 - 6 /LPF    Methodology Automated Microscopy    ECG 12 Lead ED Triage Standing Order; SOA   Result Value Ref Range    QT Interval 364 ms    QTC Interval 409 ms   Green Top (Gel)   Result Value Ref Range    Extra Tube Hold for add-ons.    Lavender Top   Result Value Ref Range    Extra Tube hold for add-on    Gold Top - SST   Result Value Ref Range    Extra Tube Hold for add-ons.    Gray Top   Result Value Ref Range    Extra Tube Hold for add-ons.    Light Blue Top   Result Value Ref Range    Extra Tube Hold for add-ons.         If labs were ordered, I independently reviewed the results and considered them in treating the patient.      EMERGENCY DEPARTMENT COURSE and DIFFERENTIAL DIAGNOSIS/MDM:   Vitals:  AS OF 02:07 EST    BP - 153/82  HR - 73  TEMP - 98.1 °F (36.7 °C) (Oral)  O2 SATS - 99%        Discussion below represents my analysis of pertinent findings related to patient's condition, differential diagnosis, treatment plan and final disposition.      Differential diagnosis:  The differential diagnosis associated with the patient's presentation includes: Pneumonia, rib fracture, pneumothorax, ureteral stone, urinary tract infection, muscular strain      Independent interpretations (ECG/rhythm strip/X-ray/US/CT scan): I independently interpreted the patient's abdominal CT and chest x-ray.  There is no evidence of pulmonary infiltrate and no evidence of ureteral stone.        Patient's care impacted by:   [] Diabetes   [] Hypertension   [] Coronary Artery Disease   [] Cancer   [x] Other: Smoker    Care significantly affected by Social Determinants of Health (housing and economic circumstances, unemployment)    [] Yes     [x] No   If yes, Patient's care significantly limited by  Social Determinants of Health including:    [] Inadequate housing    [] Low income    [] Alcoholism and drug addiction in family    [] Problems related to primary support group    []  Unemployment    [] Problems related to employment    [] Other Social Determinants of Health:       Additional orders considered but not ordered:  The following testing was considered but ultimately not selected after discussion with patient/family: I considered pulmonary embolism and testing with either D-dimer or CTA of the chest, however patient is low risk for VTE and negative by the PERC score and therefore feel that he is clinically ruled out.    ED Course:    ED Course as of 02/07/24 0207   Tue Feb 06, 2024   2250 On reexamination, this patient you very well-appearing and nontoxic.  He is resting comfortably in chair with normal vital signs.  Lab work shows some mild hyperglycemia in the setting of known diabetes.  He also has some pyuria.  I questioned him about sexual history.  He tells me that he is  and has had no new sexual partners for years and has no concern about STI.  Will go ahead and add on GC and chlamydia to his urine as well as obtain urine culture and place him on Ceftin.  His abdominal scan shows no evidence of ureteral stone, rib fracture, or other acute process.  Low clinical suspicion for PE.  Patient is low risk and negative by the PERC score and therefore is clinically ruled out.  He is stable for discharge home with symptomatic management and close outpatient follow-up. [NS]      ED Course User Index  [NS] Alessandro Downing MD             I had a discussion with the patient/family regarding diagnosis, diagnostic results, treatment plan, and medications.  The patient/family indicated understanding of these instructions.  I spent adequate time at the bedside preceding discharge necessary to personally discuss the aftercare instructions, giving patient education, providing explanations of the results of our evaluations/findings, and my decision making to assure that the patient/family understand the plan of care.  Time was allotted to answer questions at that time and throughout  the ED course.  Emphasis was placed on timely follow-up after discharge.  I also discussed the potential for the development of an acute emergent condition requiring further evaluation, admission, or even surgical intervention. I discussed that we found nothing during the visit today indicating the need for further workup, admission, or the presence of an unstable medical condition.  I encouraged the patient to return to the emergency department immediately for ANY concerns, worsening, new complaints, or if symptoms persist and unable to seek follow-up in a timely fashion.  The patient/family expressed understanding and agreement with this plan.  The patient will follow-up with their PCP in 1-2 days for reevaluation.           PROCEDURES:  Procedures    CRITICAL CARE TIME        FINAL IMPRESSION      1. Acute right flank pain    2. Pyuria    3. History of diabetes mellitus          DISPOSITION/PLAN     ED Disposition       ED Disposition   Discharge    Condition   Stable    Comment   --                 Comment: Please note this report has been produced using speech recognition software.      Alessandro Downing MD  Attending Emergency Physician             Alessandro Downing MD  02/07/24 0207

## 2024-02-08 LAB
BACTERIA SPEC AEROBE CULT: NORMAL
C TRACH RRNA SPEC QL NAA+PROBE: NEGATIVE
N GONORRHOEA RRNA SPEC QL NAA+PROBE: NEGATIVE

## 2024-02-12 LAB
QT INTERVAL: 364 MS
QTC INTERVAL: 409 MS